# Patient Record
Sex: MALE | Race: WHITE | NOT HISPANIC OR LATINO | Employment: FULL TIME | URBAN - NONMETROPOLITAN AREA
[De-identification: names, ages, dates, MRNs, and addresses within clinical notes are randomized per-mention and may not be internally consistent; named-entity substitution may affect disease eponyms.]

---

## 2017-01-09 ENCOUNTER — OFFICE VISIT (OUTPATIENT)
Dept: SURGERY | Facility: CLINIC | Age: 37
End: 2017-01-09

## 2017-01-09 VITALS
DIASTOLIC BLOOD PRESSURE: 100 MMHG | BODY MASS INDEX: 34.54 KG/M2 | SYSTOLIC BLOOD PRESSURE: 140 MMHG | HEIGHT: 72 IN | WEIGHT: 255 LBS

## 2017-01-09 DIAGNOSIS — K42.9 RECURRENT UMBILICAL HERNIA: Primary | ICD-10-CM

## 2017-01-09 PROCEDURE — 99203 OFFICE O/P NEW LOW 30 MIN: CPT | Performed by: SURGERY

## 2017-01-09 NOTE — PROGRESS NOTES
CHIEF COMPLAINT:    Recurrent umbilical hernia    HISTORY OF PRESENT ILLNESS:    Jovany Knutson is a 36 y.o. male who presents with a recurrent umbilical hernia.  It has been present since age 18.  He has previously had it repaired without mesh in [place].  He says the bulge returned shortly after he returned to work following the repair.  He has minimal discomfort from the hernia which reduces spontaneously.  He continues to work as an  and routinely does heavy lifting.  He notes no obstructive symptoms.    Past Medical History   Diagnosis Date   • Acute bronchitis    • Diabetes mellitus screening    • Dull chest pain      Left chronic chest pain- for 2 years.         • Familial polycythemia      Gets phlebotomy [id number]    • Heavy tobacco smoker      17 year hx of 1-2 ppd.    • Screening for deficiency anemia    • Screening for hyperlipidemia    • Thyroid disorder screening        Past Surgical History   Procedure Laterality Date   • Thumb amputation       Amputation, finger/thumb (Rt. thumb)   • Total knee arthroplasty     • Umbilical hernia repair           Current Outpatient Prescriptions:   •  ibuprofen (ADVIL,MOTRIN) 200 MG tablet, Take 800 mg by mouth Every 6 (Six) Hours As Needed for mild pain (1-3)., Disp: , Rfl:     No Known Allergies    History reviewed. No pertinent family history.    Social History     Social History   • Marital status:      Spouse name: N/A   • Number of children: N/A   • Years of education: N/A     Occupational History   • Not on file.     Social History Main Topics   • Smoking status: Current Every Day Smoker   • Smokeless tobacco: Not on file   • Alcohol use Yes   • Drug use: Not on file   • Sexual activity: Not on file     Other Topics Concern   • Not on file     Social History Narrative       Review of Systems   Constitutional: Negative for activity change, appetite change, chills and fever.   HENT: Negative for hearing loss, nosebleeds and trouble  "swallowing.    Cardiovascular: Negative for chest pain, palpitations and leg swelling.   Gastrointestinal: Negative for abdominal distention, abdominal pain, anal bleeding, blood in stool, constipation, diarrhea, nausea, rectal pain and vomiting.   Endocrine: Negative for cold intolerance, heat intolerance, polydipsia and polyuria.   Genitourinary: Negative for decreased urine volume, difficulty urinating, dysuria, enuresis, frequency, hematuria and urgency.   Musculoskeletal: Negative for arthralgias, back pain, gait problem, myalgias and neck pain.   Skin: Negative for pallor, rash and wound.   Allergic/Immunologic: Negative for immunocompromised state.   Neurological: Negative for dizziness, seizures, weakness, light-headedness, numbness and headaches.   Psychiatric/Behavioral: Negative for agitation and behavioral problems. The patient is not nervous/anxious.        Objective     Visit Vitals   • /100   • Ht 72\" (182.9 cm)   • Wt 255 lb (116 kg)   • BMI 34.58 kg/m2       Physical Exam   Constitutional: He is oriented to person, place, and time. He appears well-developed and well-nourished.   HENT:   Head: Normocephalic and atraumatic.   Nose: Nose normal.   Eyes: Conjunctivae and EOM are normal. Right eye exhibits no discharge. Left eye exhibits no discharge.   Neck: Trachea normal, normal range of motion and phonation normal. Neck supple. No JVD present. No tracheal deviation and no edema present. No thyromegaly present.   Cardiovascular: Normal rate, regular rhythm and normal heart sounds.  Exam reveals no gallop and no friction rub.    No murmur heard.  Pulmonary/Chest: Effort normal and breath sounds normal. No accessory muscle usage. No respiratory distress. He has no decreased breath sounds. He has no wheezes. He has no rales. He exhibits no tenderness.   Abdominal: Soft. He exhibits no distension, no fluid wave, no ascites, no pulsatile midline mass and no mass. There is no tenderness. There is no " rebound and no guarding. A hernia is present.       Musculoskeletal: Normal range of motion. He exhibits no edema, tenderness or deformity.   Lymphadenopathy:     He has no cervical adenopathy.        Left: No supraclavicular adenopathy present.   Neurological: He is alert and oriented to person, place, and time. He has normal strength. No cranial nerve deficit.   Skin: Skin is warm and dry. No rash noted. He is not diaphoretic. No erythema. No pallor.   Psychiatric: He has a normal mood and affect. His speech is normal and behavior is normal. Judgment and thought content normal. Cognition and memory are normal.   Vitals reviewed.      DIAGNOSTIC DATA:    Preop labs ordered    ASSESSMENT:    Recurrent umbilical/incisional hernia    PLAN:    He would like his hernia repaired as he is concerned about it getting larger.  He plans to do this next month to give his workplace time to make scheduling adjustments.  Risks and benefits of open repair of recurrent umbilical (incisional) hernia with mesh were discussed with he and his wife and he wishes to proceed.  Scheduled for 2/6/17.

## 2017-02-01 ENCOUNTER — APPOINTMENT (OUTPATIENT)
Dept: CT IMAGING | Facility: HOSPITAL | Age: 37
End: 2017-02-01

## 2017-02-01 ENCOUNTER — APPOINTMENT (OUTPATIENT)
Dept: GENERAL RADIOLOGY | Facility: HOSPITAL | Age: 37
End: 2017-02-01

## 2017-02-01 ENCOUNTER — APPOINTMENT (OUTPATIENT)
Dept: PREADMISSION TESTING | Facility: HOSPITAL | Age: 37
End: 2017-02-01

## 2017-02-01 ENCOUNTER — HOSPITAL ENCOUNTER (EMERGENCY)
Facility: HOSPITAL | Age: 37
Discharge: HOME | End: 2017-02-01
Attending: EMERGENCY MEDICINE | Admitting: EMERGENCY MEDICINE

## 2017-02-01 VITALS
WEIGHT: 260 LBS | OXYGEN SATURATION: 94 % | HEIGHT: 72 IN | DIASTOLIC BLOOD PRESSURE: 74 MMHG | SYSTOLIC BLOOD PRESSURE: 117 MMHG | HEART RATE: 60 BPM | BODY MASS INDEX: 35.21 KG/M2 | RESPIRATION RATE: 16 BRPM | TEMPERATURE: 98 F

## 2017-02-01 VITALS — BODY MASS INDEX: 34.4 KG/M2 | WEIGHT: 254 LBS | HEIGHT: 72 IN

## 2017-02-01 DIAGNOSIS — R07.89 CHEST PAIN, ATYPICAL: Primary | ICD-10-CM

## 2017-02-01 DIAGNOSIS — R09.1 PLEURISY: ICD-10-CM

## 2017-02-01 LAB
ALBUMIN SERPL-MCNC: 4.4 G/DL (ref 3.4–4.8)
ALBUMIN/GLOB SERPL: 1.7 G/DL (ref 1.1–1.8)
ALP SERPL-CCNC: 67 U/L (ref 38–126)
ALT SERPL W P-5'-P-CCNC: 57 U/L (ref 21–72)
ANION GAP SERPL CALCULATED.3IONS-SCNC: 9 MMOL/L (ref 5–15)
AST SERPL-CCNC: 29 U/L (ref 17–59)
BASOPHILS # BLD AUTO: 0.06 10*3/MM3 (ref 0–0.2)
BASOPHILS NFR BLD AUTO: 0.6 % (ref 0–2)
BILIRUB SERPL-MCNC: 0.7 MG/DL (ref 0.2–1.3)
BILIRUB UR QL STRIP: NEGATIVE
BUN BLD-MCNC: 9 MG/DL (ref 7–21)
BUN/CREAT SERPL: 12 (ref 7–25)
CALCIUM SPEC-SCNC: 9.2 MG/DL (ref 8.4–10.2)
CHLORIDE SERPL-SCNC: 104 MMOL/L (ref 95–110)
CLARITY UR: CLEAR
CO2 SERPL-SCNC: 26 MMOL/L (ref 22–31)
COLOR UR: YELLOW
CREAT BLD-MCNC: 0.75 MG/DL (ref 0.7–1.3)
DEPRECATED RDW RBC AUTO: 41 FL (ref 35.1–43.9)
EOSINOPHIL # BLD AUTO: 0.18 10*3/MM3 (ref 0–0.7)
EOSINOPHIL NFR BLD AUTO: 1.7 % (ref 0–7)
ERYTHROCYTE [DISTWIDTH] IN BLOOD BY AUTOMATED COUNT: 12.9 % (ref 11.5–14.5)
GFR SERPL CREATININE-BSD FRML MDRD: 117 ML/MIN/1.73 (ref 70–162)
GLOBULIN UR ELPH-MCNC: 2.6 GM/DL (ref 2.3–3.5)
GLUCOSE BLD-MCNC: 99 MG/DL (ref 60–100)
GLUCOSE UR STRIP-MCNC: NEGATIVE MG/DL
HCT VFR BLD AUTO: 50.9 % (ref 39–49)
HGB BLD-MCNC: 18.6 G/DL (ref 13.7–17.3)
HGB UR QL STRIP.AUTO: NEGATIVE
HOLD SPECIMEN: NORMAL
HOLD SPECIMEN: NORMAL
IMM GRANULOCYTES # BLD: 0.02 10*3/MM3 (ref 0–0.02)
IMM GRANULOCYTES NFR BLD: 0.2 % (ref 0–0.5)
INR PPP: 0.95 (ref 0.8–1.2)
KETONES UR QL STRIP: NEGATIVE
LEUKOCYTE ESTERASE UR QL STRIP.AUTO: NEGATIVE
LYMPHOCYTES # BLD AUTO: 3.62 10*3/MM3 (ref 0.6–4.2)
LYMPHOCYTES NFR BLD AUTO: 33.9 % (ref 10–50)
MCH RBC QN AUTO: 31.9 PG (ref 26.5–34)
MCHC RBC AUTO-ENTMCNC: 36.5 G/DL (ref 31.5–36.3)
MCV RBC AUTO: 87.3 FL (ref 80–98)
MONOCYTES # BLD AUTO: 0.74 10*3/MM3 (ref 0–0.9)
MONOCYTES NFR BLD AUTO: 6.9 % (ref 0–12)
MRSA DNA SPEC QL NAA+PROBE: NEGATIVE
NEUTROPHILS # BLD AUTO: 6.06 10*3/MM3 (ref 2–8.6)
NEUTROPHILS NFR BLD AUTO: 56.7 % (ref 37–80)
NITRITE UR QL STRIP: NEGATIVE
NRBC BLD MANUAL-RTO: 0 /100 WBC (ref 0–0)
PH UR STRIP.AUTO: 7 [PH] (ref 5–9)
PLATELET # BLD AUTO: 183 10*3/MM3 (ref 150–450)
PMV BLD AUTO: 12.6 FL (ref 8–12)
POTASSIUM BLD-SCNC: 4.1 MMOL/L (ref 3.5–5.1)
PROT SERPL-MCNC: 7 G/DL (ref 6.3–8.6)
PROT UR QL STRIP: NEGATIVE
PROTHROMBIN TIME: 12.7 SECONDS (ref 11.1–15.3)
RBC # BLD AUTO: 5.83 10*6/MM3 (ref 4.37–5.74)
SODIUM BLD-SCNC: 139 MMOL/L (ref 137–145)
SP GR UR STRIP: 1.01 (ref 1–1.03)
TROPONIN I SERPL-MCNC: <0.012 NG/ML
UROBILINOGEN UR QL STRIP: NORMAL
WBC NRBC COR # BLD: 10.68 10*3/MM3 (ref 3.2–9.8)
WHOLE BLOOD HOLD SPECIMEN: NORMAL
WHOLE BLOOD HOLD SPECIMEN: NORMAL

## 2017-02-01 PROCEDURE — 93005 ELECTROCARDIOGRAM TRACING: CPT | Performed by: EMERGENCY MEDICINE

## 2017-02-01 PROCEDURE — 87641 MR-STAPH DNA AMP PROBE: CPT | Performed by: SURGERY

## 2017-02-01 PROCEDURE — 85610 PROTHROMBIN TIME: CPT | Performed by: EMERGENCY MEDICINE

## 2017-02-01 PROCEDURE — 80053 COMPREHEN METABOLIC PANEL: CPT | Performed by: EMERGENCY MEDICINE

## 2017-02-01 PROCEDURE — 70450 CT HEAD/BRAIN W/O DYE: CPT

## 2017-02-01 PROCEDURE — 93010 ELECTROCARDIOGRAM REPORT: CPT | Performed by: INTERNAL MEDICINE

## 2017-02-01 PROCEDURE — 81003 URINALYSIS AUTO W/O SCOPE: CPT | Performed by: EMERGENCY MEDICINE

## 2017-02-01 PROCEDURE — 84484 ASSAY OF TROPONIN QUANT: CPT | Performed by: EMERGENCY MEDICINE

## 2017-02-01 PROCEDURE — 99284 EMERGENCY DEPT VISIT MOD MDM: CPT

## 2017-02-01 PROCEDURE — 85025 COMPLETE CBC W/AUTO DIFF WBC: CPT | Performed by: EMERGENCY MEDICINE

## 2017-02-01 PROCEDURE — 71010 HC CHEST PA OR AP: CPT

## 2017-02-01 PROCEDURE — 93005 ELECTROCARDIOGRAM TRACING: CPT

## 2017-02-01 RX ORDER — ASPIRIN 81 MG/1
81 TABLET ORAL DAILY
Qty: 30 TABLET | Refills: 0 | Status: SHIPPED | OUTPATIENT
Start: 2017-02-01

## 2017-02-01 RX ORDER — SODIUM CHLORIDE 0.9 % (FLUSH) 0.9 %
10 SYRINGE (ML) INJECTION AS NEEDED
Status: DISCONTINUED | OUTPATIENT
Start: 2017-02-01 | End: 2017-02-01 | Stop reason: HOSPADM

## 2017-02-01 RX ORDER — ASPIRIN 325 MG
325 TABLET ORAL ONCE
Status: DISCONTINUED | OUTPATIENT
Start: 2017-02-01 | End: 2017-02-01

## 2017-02-01 RX ADMIN — NITROGLYCERIN 1 INCH: 20 OINTMENT TOPICAL at 10:55

## 2017-02-01 RX ADMIN — Medication 10 ML: at 10:59

## 2017-02-01 NOTE — ED PROVIDER NOTES
Subjective   HPI Comments: 35 yo male, with no current medical problems being treated, notes onset of chest pain last night.  States this was a tightness in the chest lasting hours to a dull ache last night.  Patient also notes an acute onset headache during his chest pain causing his blood pressure to elevated.  Denies f/c.  Denies n/v, though did have nausea last night.  No known problems.      Patient is a 36 y.o. male presenting with chest pain.   History provided by:  Patient   used: No    Chest Pain   Pain location:  Substernal area  Pain quality: aching, dull and pressure    Pain quality: not sharp and no tightness    Pain radiates to:  Does not radiate  Pain severity:  Moderate  Onset quality:  Gradual  Duration: 12   Timing:  Unable to specify  Progression:  Improving  Chronicity:  Recurrent (Patient with prior bouts of CP, which led to a stress test two years ago without notable pathology)  Context: breathing and stress    Relieved by:  Nothing  Worsened by:  Nothing  Ineffective treatments:  None tried  Associated symptoms: anxiety, cough, dizziness, headache, nausea, near-syncope, shortness of breath and weakness    Associated symptoms: no abdominal pain, no altered mental status, no back pain, no diaphoresis, no fever, no lower extremity edema, no numbness, no orthopnea, no palpitations, no PND, no syncope and no vomiting        Review of Systems   Constitutional: Negative for appetite change, chills, diaphoresis and fever.   HENT: Negative for congestion.    Eyes: Negative.  Negative for photophobia and visual disturbance.   Respiratory: Positive for cough and shortness of breath. Negative for chest tightness and wheezing.    Cardiovascular: Positive for chest pain and near-syncope. Negative for palpitations, orthopnea, syncope and PND.   Gastrointestinal: Positive for nausea. Negative for abdominal pain, constipation, diarrhea and vomiting.   Endocrine: Negative.    Genitourinary:  Negative.  Negative for decreased urine volume, dysuria, flank pain and hematuria.   Musculoskeletal: Positive for neck pain (chronic at T1 spinous process). Negative for arthralgias, back pain, myalgias and neck stiffness.   Skin: Negative.  Negative for pallor.   Neurological: Positive for dizziness, weakness, light-headedness and headaches. Negative for syncope and numbness.   Psychiatric/Behavioral: Negative.  Negative for confusion and suicidal ideas. The patient is not nervous/anxious.        Past Medical History   Diagnosis Date   • Acute bronchitis    • Diabetes mellitus screening    • Dull chest pain      Left chronic chest pain- for 2 years.         • Familial polycythemia      Pt. states he is supposed to ets phlebotomy [id number] per family  but he has only gave blood 1 time in the past year.   • Heavy tobacco smoker      17 year hx of 1-2 ppd.    • Screening for deficiency anemia    • Screening for hyperlipidemia    • Thyroid disorder screening        No Known Allergies    Past Surgical History   Procedure Laterality Date   • Thumb amputation       Amputation, finger/thumb (Rt. thumb)   • Total knee arthroplasty     • Umbilical hernia repair         No family history on file.    Social History     Social History   • Marital status:      Spouse name: N/A   • Number of children: N/A   • Years of education: N/A     Social History Main Topics   • Smoking status: Current Every Day Smoker   • Smokeless tobacco: None   • Alcohol use Yes   • Drug use: None   • Sexual activity: Not Asked     Other Topics Concern   • None     Social History Narrative           Objective   Physical Exam   Constitutional: He is oriented to person, place, and time. He appears well-developed and well-nourished. No distress.   HENT:   Head: Normocephalic and atraumatic.   Eyes: Conjunctivae and EOM are normal.   Neck: Normal range of motion. Neck supple. No JVD present.   Tenderness, mild over T1 vertebrae   Cardiovascular:  Normal rate, regular rhythm, normal heart sounds and intact distal pulses.  Exam reveals no gallop and no friction rub.    No murmur heard.  Pulmonary/Chest: Effort normal. No respiratory distress. He has no wheezes. He has no rales. He exhibits no tenderness.   Abdominal: Soft. Bowel sounds are normal. He exhibits no distension and no mass. There is no tenderness. There is no rebound and no guarding.   Musculoskeletal: Normal range of motion.   Neurological: He is alert and oriented to person, place, and time. No cranial nerve deficit. He exhibits normal muscle tone.   Skin: Skin is warm and dry.   Psychiatric: He has a normal mood and affect. His behavior is normal. Judgment and thought content normal.   Nursing note and vitals reviewed.      Procedures         ED Course  ED Course      Labs Reviewed   CBC WITH AUTO DIFFERENTIAL - Abnormal; Notable for the following:        Result Value    WBC 10.68 (*)     RBC 5.83 (*)     Hemoglobin 18.6 (*)     Hematocrit 50.9 (*)     MCHC 36.5 (*)     MPV 12.6 (*)     All other components within normal limits   COMPREHENSIVE METABOLIC PANEL - Normal   TROPONIN (IN-HOUSE) - Normal   PROTIME-INR - Normal    Narrative:     Therapeutic range for most indications is 2.0-3.0 INR,  or 2.5-3.5 for mechanical heart valves.   URINALYSIS W/ CULTURE IF INDICATED - Normal    Narrative:     Urine microscopic not indicated.   RAINBOW DRAW    Narrative:     The following orders were created for panel order [facility] Draw.  Procedure                               Abnormality         Status                     ---------                               -----------         ------                     Light Blue Top[[id number]]                                    In process                 Green Top (Gel)[[id number]]                                   In process                 Lavender Top[[id number]]                                      In process                 Gold Top - SST[[id number]]                                     In process                   Please view results for these tests on the individual orders.   CBC AND DIFFERENTIAL    Narrative:     The following orders were created for panel order CBC & Differential.  Procedure                               Abnormality         Status                     ---------                               -----------         ------                     CBC Auto Differential[[id number]]         Abnormal            Final result                 Please view results for these tests on the individual orders.   LIGHT BLUE TOP   GREEN TOP   LAVENDER TOP   GOLD TOP - SST       CT Head Without Contrast   Final Result   No evidence of intracranial hemorrhage, mass effect or large acute infarct.      Electronically signed by:  Pedrito Pierce MD  2/1/2017 11:49 AM CST         XR Chest 1 View   Final Result   No radiographic evidence of acute cardiopulmonary disease.      Electronically signed by:  Diamante Wells MD  2/1/2017 11:33 AM CST           No significant findings.  Nonanginal quality symtoms >12 hours with negative markers and normal EKG.  Outpatient followup with cardiology for stress testing.  Continues stable in the ED.              MDM    Final diagnoses:   Chest pain, atypical   Pleurisy            Saman Cruz MD  02/01/17 [id number]

## 2017-02-01 NOTE — ED NOTES
Pt alert x4 respirations easy non labored, ambulates without distress.      Akilah Pardo RN  02/01/17 3920

## 2017-02-01 NOTE — DISCHARGE INSTRUCTIONS
Attempt to quit smoking as this can worsen symptoms.  Followup with Dr. [facility] for stress testing, please call for appointment time which fits with your schedule.  Return with any new or worsening symptoms, or any concerns for further evaluation.

## 2017-02-03 ENCOUNTER — ANESTHESIA EVENT (OUTPATIENT)
Dept: PERIOP | Facility: HOSPITAL | Age: 37
End: 2017-02-03

## 2017-02-06 ENCOUNTER — HOSPITAL ENCOUNTER (OUTPATIENT)
Facility: HOSPITAL | Age: 37
Setting detail: HOSPITAL OUTPATIENT SURGERY
Discharge: HOME | End: 2017-02-06
Attending: SURGERY | Admitting: SURGERY

## 2017-02-06 ENCOUNTER — ANESTHESIA (OUTPATIENT)
Dept: PERIOP | Facility: HOSPITAL | Age: 37
End: 2017-02-06

## 2017-02-06 VITALS
OXYGEN SATURATION: 96 % | WEIGHT: 253.53 LBS | SYSTOLIC BLOOD PRESSURE: 153 MMHG | TEMPERATURE: 97.9 F | DIASTOLIC BLOOD PRESSURE: 82 MMHG | BODY MASS INDEX: 34.38 KG/M2 | HEART RATE: 84 BPM | RESPIRATION RATE: 18 BRPM

## 2017-02-06 LAB
AMPHET+METHAMPHET UR QL: NEGATIVE
BARBITURATES UR QL SCN: NEGATIVE
BENZODIAZ UR QL SCN: NEGATIVE
CANNABINOIDS SERPL QL: POSITIVE
COCAINE UR QL: NEGATIVE
GLUCOSE BLDC GLUCOMTR-MCNC: 100 MG/DL (ref 70–130)
METHADONE UR QL SCN: NEGATIVE
OPIATES UR QL: NEGATIVE
OXYCODONE UR QL SCN: NEGATIVE

## 2017-02-06 PROCEDURE — 82962 GLUCOSE BLOOD TEST: CPT

## 2017-02-06 PROCEDURE — 80307 DRUG TEST PRSMV CHEM ANLYZR: CPT | Performed by: ANESTHESIOLOGY

## 2017-02-06 PROCEDURE — 25010000002 FENTANYL CITRATE (PF) 100 MCG/2ML SOLUTION: Performed by: NURSE ANESTHETIST, CERTIFIED REGISTERED

## 2017-02-06 PROCEDURE — 25010000002 PROPOFOL 10 MG/ML EMULSION: Performed by: NURSE ANESTHETIST, CERTIFIED REGISTERED

## 2017-02-06 PROCEDURE — 49560 PR REPAIR INCISIONAL HERNIA,REDUCIBLE: CPT | Performed by: SURGERY

## 2017-02-06 PROCEDURE — 25010000002 MIDAZOLAM PER 1 MG: Performed by: ANESTHESIOLOGY

## 2017-02-06 PROCEDURE — 25010000003 CEFAZOLIN PER 500 MG: Performed by: SURGERY

## 2017-02-06 PROCEDURE — C1781 MESH (IMPLANTABLE): HCPCS | Performed by: SURGERY

## 2017-02-06 PROCEDURE — 49568 PR IMPLANT MESH HERNIA REPAIR/DEBRIDEMENT CLOSURE: CPT | Performed by: SURGERY

## 2017-02-06 PROCEDURE — 25010000002 NEOSTIGMINE PER 0.5 MG: Performed by: NURSE ANESTHETIST, CERTIFIED REGISTERED

## 2017-02-06 PROCEDURE — 25010000002 SUCCINYLCHOLINE PER 20 MG: Performed by: NURSE ANESTHETIST, CERTIFIED REGISTERED

## 2017-02-06 DEVICE — VENTRALEX ST HERNIA PATCH
Type: IMPLANTABLE DEVICE | Site: ABDOMEN | Status: FUNCTIONAL
Brand: VENTRALEX ST HERNIA PATCH

## 2017-02-06 RX ORDER — SCOLOPAMINE TRANSDERMAL SYSTEM 1 MG/1
1 PATCH, EXTENDED RELEASE TRANSDERMAL ONCE
Status: DISCONTINUED | OUTPATIENT
Start: 2017-02-06 | End: 2017-02-06 | Stop reason: HOSPADM

## 2017-02-06 RX ORDER — FENTANYL CITRATE 50 UG/ML
INJECTION, SOLUTION INTRAMUSCULAR; INTRAVENOUS AS NEEDED
Status: DISCONTINUED | OUTPATIENT
Start: 2017-02-06 | End: 2017-02-06 | Stop reason: SURG

## 2017-02-06 RX ORDER — ONDANSETRON 2 MG/ML
4 INJECTION INTRAMUSCULAR; INTRAVENOUS ONCE AS NEEDED
Status: DISCONTINUED | OUTPATIENT
Start: 2017-02-06 | End: 2017-02-06 | Stop reason: HOSPADM

## 2017-02-06 RX ORDER — PROPOFOL 10 MG/ML
VIAL (ML) INTRAVENOUS AS NEEDED
Status: DISCONTINUED | OUTPATIENT
Start: 2017-02-06 | End: 2017-02-06 | Stop reason: SURG

## 2017-02-06 RX ORDER — LABETALOL HYDROCHLORIDE 5 MG/ML
5 INJECTION, SOLUTION INTRAVENOUS
Status: DISCONTINUED | OUTPATIENT
Start: 2017-02-06 | End: 2017-02-06 | Stop reason: HOSPADM

## 2017-02-06 RX ORDER — MORPHINE SULFATE 2 MG/ML
2 INJECTION, SOLUTION INTRAMUSCULAR; INTRAVENOUS
Status: DISCONTINUED | OUTPATIENT
Start: 2017-02-06 | End: 2017-02-06 | Stop reason: HOSPADM

## 2017-02-06 RX ORDER — ALBUTEROL SULFATE 2.5 MG/3ML
2.5 SOLUTION RESPIRATORY (INHALATION) ONCE
Status: COMPLETED | OUTPATIENT
Start: 2017-02-06 | End: 2017-02-06

## 2017-02-06 RX ORDER — FLUMAZENIL 0.1 MG/ML
0.2 INJECTION INTRAVENOUS AS NEEDED
Status: DISCONTINUED | OUTPATIENT
Start: 2017-02-06 | End: 2017-02-06 | Stop reason: HOSPADM

## 2017-02-06 RX ORDER — GLYCOPYRROLATE 0.2 MG/ML
INJECTION INTRAMUSCULAR; INTRAVENOUS AS NEEDED
Status: DISCONTINUED | OUTPATIENT
Start: 2017-02-06 | End: 2017-02-06 | Stop reason: SURG

## 2017-02-06 RX ORDER — SODIUM CHLORIDE 0.9 % (FLUSH) 0.9 %
1-10 SYRINGE (ML) INJECTION AS NEEDED
Status: DISCONTINUED | OUTPATIENT
Start: 2017-02-06 | End: 2017-02-06 | Stop reason: HOSPADM

## 2017-02-06 RX ORDER — SUCCINYLCHOLINE CHLORIDE 20 MG/ML
INJECTION INTRAMUSCULAR; INTRAVENOUS AS NEEDED
Status: DISCONTINUED | OUTPATIENT
Start: 2017-02-06 | End: 2017-02-06 | Stop reason: SURG

## 2017-02-06 RX ORDER — HYDRALAZINE HYDROCHLORIDE 20 MG/ML
5 INJECTION INTRAMUSCULAR; INTRAVENOUS
Status: DISCONTINUED | OUTPATIENT
Start: 2017-02-06 | End: 2017-02-06 | Stop reason: HOSPADM

## 2017-02-06 RX ORDER — DIPHENHYDRAMINE HYDROCHLORIDE 50 MG/ML
12.5 INJECTION INTRAMUSCULAR; INTRAVENOUS
Status: DISCONTINUED | OUTPATIENT
Start: 2017-02-06 | End: 2017-02-06 | Stop reason: HOSPADM

## 2017-02-06 RX ORDER — FAMOTIDINE 10 MG/ML
20 INJECTION, SOLUTION INTRAVENOUS ONCE
Status: DISCONTINUED | OUTPATIENT
Start: 2017-02-06 | End: 2017-02-06 | Stop reason: HOSPADM

## 2017-02-06 RX ORDER — SODIUM CHLORIDE 9 MG/ML
1000 INJECTION, SOLUTION INTRAVENOUS CONTINUOUS
Status: DISCONTINUED | OUTPATIENT
Start: 2017-02-06 | End: 2017-02-06 | Stop reason: HOSPADM

## 2017-02-06 RX ORDER — BUPIVACAINE HYDROCHLORIDE AND EPINEPHRINE 5; 5 MG/ML; UG/ML
INJECTION, SOLUTION EPIDURAL; INTRACAUDAL; PERINEURAL AS NEEDED
Status: DISCONTINUED | OUTPATIENT
Start: 2017-02-06 | End: 2017-02-06 | Stop reason: HOSPADM

## 2017-02-06 RX ORDER — LIDOCAINE HYDROCHLORIDE 10 MG/ML
0.5 INJECTION, SOLUTION EPIDURAL; INFILTRATION; INTRACAUDAL; PERINEURAL ONCE AS NEEDED
Status: DISCONTINUED | OUTPATIENT
Start: 2017-02-06 | End: 2017-02-06 | Stop reason: HOSPADM

## 2017-02-06 RX ORDER — DEXAMETHASONE SODIUM PHOSPHATE 4 MG/ML
8 INJECTION, SOLUTION INTRA-ARTICULAR; INTRALESIONAL; INTRAMUSCULAR; INTRAVENOUS; SOFT TISSUE ONCE AS NEEDED
Status: DISCONTINUED | OUTPATIENT
Start: 2017-02-06 | End: 2017-02-06 | Stop reason: HOSPADM

## 2017-02-06 RX ORDER — HYDROCODONE BITARTRATE AND ACETAMINOPHEN 5; 325 MG/1; MG/1
1-2 TABLET ORAL EVERY 4 HOURS PRN
Qty: 30 TABLET | Refills: 0 | Status: SHIPPED | OUTPATIENT
Start: 2017-02-06

## 2017-02-06 RX ORDER — DEXAMETHASONE SODIUM PHOSPHATE 4 MG/ML
2 INJECTION, SOLUTION INTRA-ARTICULAR; INTRALESIONAL; INTRAMUSCULAR; INTRAVENOUS; SOFT TISSUE ONCE AS NEEDED
Status: DISCONTINUED | OUTPATIENT
Start: 2017-02-06 | End: 2017-02-06 | Stop reason: HOSPADM

## 2017-02-06 RX ORDER — ACETAMINOPHEN 325 MG/1
650 TABLET ORAL ONCE AS NEEDED
Status: DISCONTINUED | OUTPATIENT
Start: 2017-02-06 | End: 2017-02-06 | Stop reason: HOSPADM

## 2017-02-06 RX ORDER — LIDOCAINE HYDROCHLORIDE 20 MG/ML
INJECTION, SOLUTION INFILTRATION; PERINEURAL AS NEEDED
Status: DISCONTINUED | OUTPATIENT
Start: 2017-02-06 | End: 2017-02-06 | Stop reason: SURG

## 2017-02-06 RX ORDER — MIDAZOLAM HYDROCHLORIDE 1 MG/ML
1 INJECTION INTRAMUSCULAR; INTRAVENOUS
Status: DISCONTINUED | OUTPATIENT
Start: 2017-02-06 | End: 2017-02-06 | Stop reason: HOSPADM

## 2017-02-06 RX ORDER — BUPIVACAINE HYDROCHLORIDE AND EPINEPHRINE 5; 5 MG/ML; UG/ML
INJECTION, SOLUTION EPIDURAL; INTRACAUDAL; PERINEURAL
Status: DISCONTINUED
Start: 2017-02-06 | End: 2017-02-06 | Stop reason: HOSPADM

## 2017-02-06 RX ORDER — METOCLOPRAMIDE HYDROCHLORIDE 5 MG/ML
10 INJECTION INTRAMUSCULAR; INTRAVENOUS ONCE AS NEEDED
Status: DISCONTINUED | OUTPATIENT
Start: 2017-02-06 | End: 2017-02-06 | Stop reason: HOSPADM

## 2017-02-06 RX ORDER — MIDAZOLAM HYDROCHLORIDE 1 MG/ML
2 INJECTION INTRAMUSCULAR; INTRAVENOUS
Status: DISCONTINUED | OUTPATIENT
Start: 2017-02-06 | End: 2017-02-06 | Stop reason: HOSPADM

## 2017-02-06 RX ORDER — NALOXONE HCL 0.4 MG/ML
0.2 VIAL (ML) INJECTION AS NEEDED
Status: DISCONTINUED | OUTPATIENT
Start: 2017-02-06 | End: 2017-02-06 | Stop reason: HOSPADM

## 2017-02-06 RX ORDER — PROMETHAZINE HYDROCHLORIDE 25 MG/ML
2.5 INJECTION, SOLUTION INTRAMUSCULAR; INTRAVENOUS
Status: DISCONTINUED | OUTPATIENT
Start: 2017-02-06 | End: 2017-02-06 | Stop reason: HOSPADM

## 2017-02-06 RX ORDER — ROCURONIUM BROMIDE 10 MG/ML
INJECTION, SOLUTION INTRAVENOUS AS NEEDED
Status: DISCONTINUED | OUTPATIENT
Start: 2017-02-06 | End: 2017-02-06 | Stop reason: SURG

## 2017-02-06 RX ORDER — ACETAMINOPHEN 650 MG/1
650 SUPPOSITORY RECTAL ONCE AS NEEDED
Status: DISCONTINUED | OUTPATIENT
Start: 2017-02-06 | End: 2017-02-06 | Stop reason: HOSPADM

## 2017-02-06 RX ADMIN — FENTANYL CITRATE 100 MCG: 50 INJECTION, SOLUTION INTRAMUSCULAR; INTRAVENOUS at 14:10

## 2017-02-06 RX ADMIN — LIDOCAINE HYDROCHLORIDE 60 MG: 20 INJECTION, SOLUTION INFILTRATION; PERINEURAL at 13:44

## 2017-02-06 RX ADMIN — PROPOFOL 40 MG: 10 INJECTION, EMULSION INTRAVENOUS at 14:10

## 2017-02-06 RX ADMIN — PROPOFOL 260 MG: 10 INJECTION, EMULSION INTRAVENOUS at 13:45

## 2017-02-06 RX ADMIN — FENTANYL CITRATE 100 MCG: 50 INJECTION, SOLUTION INTRAMUSCULAR; INTRAVENOUS at 13:44

## 2017-02-06 RX ADMIN — ROCURONIUM BROMIDE 5 MG: 10 INJECTION INTRAVENOUS at 13:45

## 2017-02-06 RX ADMIN — FENTANYL CITRATE 50 MCG: 50 INJECTION, SOLUTION INTRAMUSCULAR; INTRAVENOUS at 14:04

## 2017-02-06 RX ADMIN — FENTANYL CITRATE 100 MCG: 50 INJECTION, SOLUTION INTRAMUSCULAR; INTRAVENOUS at 14:25

## 2017-02-06 RX ADMIN — SUCCINYLCHOLINE CHLORIDE 200 MG: 20 INJECTION, SOLUTION INTRAMUSCULAR; INTRAVENOUS at 13:45

## 2017-02-06 RX ADMIN — GLYCOPYRROLATE 0.6 MG: 0.2 INJECTION, SOLUTION INTRAMUSCULAR; INTRAVENOUS at 14:25

## 2017-02-06 RX ADMIN — MIDAZOLAM 2 MG: 1 INJECTION INTRAMUSCULAR; INTRAVENOUS at 13:36

## 2017-02-06 RX ADMIN — SCOPALAMINE 1 PATCH: 1 PATCH, EXTENDED RELEASE TRANSDERMAL at 12:53

## 2017-02-06 RX ADMIN — NEOSTIGMINE METHYLSULFATE 4 MG: 1 INJECTION, SOLUTION INTRAMUSCULAR; INTRAVENOUS; SUBCUTANEOUS at 14:25

## 2017-02-06 RX ADMIN — ALBUTEROL SULFATE 2.5 MG: 2.5 SOLUTION RESPIRATORY (INHALATION) at 12:53

## 2017-02-06 RX ADMIN — MIDAZOLAM 2 MG: 1 INJECTION INTRAMUSCULAR; INTRAVENOUS at 13:40

## 2017-02-06 RX ADMIN — ROCURONIUM BROMIDE 25 MG: 10 INJECTION INTRAVENOUS at 13:55

## 2017-02-06 RX ADMIN — CEFAZOLIN SODIUM 2 G: 1 INJECTION, POWDER, FOR SOLUTION INTRAMUSCULAR; INTRAVENOUS at 13:49

## 2017-02-06 RX ADMIN — SODIUM CHLORIDE 1000 ML: 9 INJECTION, SOLUTION INTRAVENOUS at 12:53

## 2017-02-06 NOTE — INTERVAL H&P NOTE
H&P reviewed. The patient was examined and there are no changes to the H&P.           This document has been electronically signed by Emory Angeles MD on February 6, 2017 1:26 PM

## 2017-02-06 NOTE — ANESTHESIA PREPROCEDURE EVALUATION
Anesthesia Evaluation     Patient summary reviewed and Nursing notes reviewed    Airway   Mallampati: II  TM distance: >3 FB  Neck ROM: full  possible difficult intubation  Dental    (+) poor dentation        Pulmonary    (+) hx of smoking (Smokes 1-1/2 packs/day), decreased breath sounds (Expiratory wheeze;right chest greater than left.),     PE comment: Albuterol nebulizer given pre-op.  Cardiovascular - negative cardio ROS and normal exam    ECG reviewed  Rhythm: regular  Rate: normal  ROS comment: EKG:NSR    Neuro/Psych- negative ROS  GI/Hepatic/Renal/Endo - negative ROS     Musculoskeletal (-) negative ROS    Abdominal    Substance History - negative use     OB/GYN negative ob/gyn ROS         Other - negative ROS                            Anesthesia Plan    ASA 3     general     intravenous induction   Anesthetic plan and risks discussed with patient.

## 2017-02-06 NOTE — ANESTHESIA PROCEDURE NOTES
Airway  Urgency: elective      General Information and Staff    Patient location during procedure: OR  Anesthesiologist: VEE ROMO  CRNA: BONIFACIO SELLERS    Indications and Patient Condition  Indications for airway management: airway protection    Preoxygenated: yes  MILS maintained throughout  Mask difficulty assessment: 1 - vent by mask    Final Airway Details  Final airway type: endotracheal airway      Successful airway: ETT  Cuffed: yes   Successful intubation technique: direct laryngoscopy  Facilitating devices/methods: anterior pressure/BURP  Endotracheal tube insertion site: oral  Blade: Wai  Blade size: #4  ETT size: 8.0 mm  Cormack-Lehane Classification: grade IIb - view of arytenoids or posterior of glottis only  Placement verified by: chest auscultation and capnometry   Measured from: gums  ETT to gums (cm): 21  Number of attempts at approach: 1    Additional Comments  With anterior pressure grade IIb.

## 2017-02-06 NOTE — PLAN OF CARE
Problem: Patient Care Overview (Adult)  Goal: Plan of Care Review  Outcome: Outcome(s) achieved Date Met:  02/06/17  Discharge criteria met.  Goal: Adult Individualization and Mutuality  Outcome: Outcome(s) achieved Date Met:  02/06/17  Goal: Discharge Needs Assessment  Outcome: Outcome(s) achieved Date Met:  02/06/17    Problem: Perioperative Period (Adult)  Goal: Signs and Symptoms of Listed Potential Problems Will be Absent or Manageable (Perioperative Period)  Outcome: Outcome(s) achieved Date Met:  02/06/17

## 2017-02-06 NOTE — OP NOTE
UMBILICAL HERNIA REPAIR  Procedure Note    Jovany Knutson  2/6/2017    Pre-op Diagnosis:   HERNIA , INCISIONAL     Post-op Diagnosis:     Post-Op Diagnosis Codes:     * Hernia, incisional [K43.2]    Procedure/CPT® Codes:      Procedure(s):  OPEN REPAIR RECURRENT UMBILICAL HERNIA WITH MESH (open incisional hernia repair with mesh)    Surgeon(s):  Emory Angeles MD    Anesthesia: General    Staff:   Circulator: Lyn Gee RN; Kg Solorzano RN  Scrub Person: Faith Eubanks  Assistant: Debbie Dia    Estimated Blood Loss: 10 mL    Specimens:                * No specimens in log *      Drains:           Findings: Two small periumbilical hernias, fat containing    Complications: none    Indication: See H and P    Operative Note:    Patient was seen and consented in same day surgery.  He was then transferred to the OR and general anesthetic was administered.  He was orotracheally intubated without incident.  A preop briefing was performed and the right groin prepped and draped in normal sterile fashion.  Following this a timeout was performed.    Following timeout the scar above his umbilicus from his prior repair was identified.  Local anesthetic was injected in this area.  The scar was excised using knife and bovie.  The bovie was then used to dissect down to the midline fascia around a small hernia sac which contained fat just above his umbilical stalk.  Once this sac had been clearly delineated we sharply dissected around his umbilical stalk using scissors.  A small hernia was identified in this location as well.  The umbilical stalk was then divided away from this hernia sac as well. This revealed that he did have two small hernias which both contained fat and were directly adjacent to one another.  The small fascial bridge between the two hernias was then divided leaving a combined defect of about 2cm.  The hernia contents were reduced below the level of the fascia.  The posterior aspect of the fascia  was then cleared for several cm using cautery.  A 4.3cm round VentraLight patch was brought onto the field and placed below the level of the fascia.  It was secured transfascially using four 0 ethibond sutures.  It appeared to be in good position.  The wound was irrigated.  The fascial defect was closed using two figure of eight 0 ethibond sutures.  The umbilical stalk was reattached using 3-0 vicryl.  The wound was then closed in layers using 3-0 and 4-0 vicryls.  Mastisol, steristrips and a pressure dressing were placed.          This document has been electronically signed by Emory Angeles MD on February 6, 2017 4:58 PM      Emory Angeles MD     Date: 2/6/2017  Time: 4:51 PM

## 2017-02-06 NOTE — H&P (VIEW-ONLY)
CHIEF COMPLAINT:    Recurrent umbilical hernia    HISTORY OF PRESENT ILLNESS:    Jovany Knutson is a 36 y.o. male who presents with a recurrent umbilical hernia.  It has been present since age 18.  He has previously had it repaired without mesh in [place].  He says the bulge returned shortly after he returned to work following the repair.  He has minimal discomfort from the hernia which reduces spontaneously.  He continues to work as an  and routinely does heavy lifting.  He notes no obstructive symptoms.    Past Medical History   Diagnosis Date   • Acute bronchitis    • Diabetes mellitus screening    • Dull chest pain      Left chronic chest pain- for 2 years.         • Familial polycythemia      Gets phlebotomy [id number]    • Heavy tobacco smoker      17 year hx of 1-2 ppd.    • Screening for deficiency anemia    • Screening for hyperlipidemia    • Thyroid disorder screening        Past Surgical History   Procedure Laterality Date   • Thumb amputation       Amputation, finger/thumb (Rt. thumb)   • Total knee arthroplasty     • Umbilical hernia repair           Current Outpatient Prescriptions:   •  ibuprofen (ADVIL,MOTRIN) 200 MG tablet, Take 800 mg by mouth Every 6 (Six) Hours As Needed for mild pain (1-3)., Disp: , Rfl:     No Known Allergies    History reviewed. No pertinent family history.    Social History     Social History   • Marital status:      Spouse name: N/A   • Number of children: N/A   • Years of education: N/A     Occupational History   • Not on file.     Social History Main Topics   • Smoking status: Current Every Day Smoker   • Smokeless tobacco: Not on file   • Alcohol use Yes   • Drug use: Not on file   • Sexual activity: Not on file     Other Topics Concern   • Not on file     Social History Narrative       Review of Systems   Constitutional: Negative for activity change, appetite change, chills and fever.   HENT: Negative for hearing loss, nosebleeds and trouble  "swallowing.    Cardiovascular: Negative for chest pain, palpitations and leg swelling.   Gastrointestinal: Negative for abdominal distention, abdominal pain, anal bleeding, blood in stool, constipation, diarrhea, nausea, rectal pain and vomiting.   Endocrine: Negative for cold intolerance, heat intolerance, polydipsia and polyuria.   Genitourinary: Negative for decreased urine volume, difficulty urinating, dysuria, enuresis, frequency, hematuria and urgency.   Musculoskeletal: Negative for arthralgias, back pain, gait problem, myalgias and neck pain.   Skin: Negative for pallor, rash and wound.   Allergic/Immunologic: Negative for immunocompromised state.   Neurological: Negative for dizziness, seizures, weakness, light-headedness, numbness and headaches.   Psychiatric/Behavioral: Negative for agitation and behavioral problems. The patient is not nervous/anxious.        Objective     Visit Vitals   • /100   • Ht 72\" (182.9 cm)   • Wt 255 lb (116 kg)   • BMI 34.58 kg/m2       Physical Exam   Constitutional: He is oriented to person, place, and time. He appears well-developed and well-nourished.   HENT:   Head: Normocephalic and atraumatic.   Nose: Nose normal.   Eyes: Conjunctivae and EOM are normal. Right eye exhibits no discharge. Left eye exhibits no discharge.   Neck: Trachea normal, normal range of motion and phonation normal. Neck supple. No JVD present. No tracheal deviation and no edema present. No thyromegaly present.   Cardiovascular: Normal rate, regular rhythm and normal heart sounds.  Exam reveals no gallop and no friction rub.    No murmur heard.  Pulmonary/Chest: Effort normal and breath sounds normal. No accessory muscle usage. No respiratory distress. He has no decreased breath sounds. He has no wheezes. He has no rales. He exhibits no tenderness.   Abdominal: Soft. He exhibits no distension, no fluid wave, no ascites, no pulsatile midline mass and no mass. There is no tenderness. There is no " rebound and no guarding. A hernia is present.       Musculoskeletal: Normal range of motion. He exhibits no edema, tenderness or deformity.   Lymphadenopathy:     He has no cervical adenopathy.        Left: No supraclavicular adenopathy present.   Neurological: He is alert and oriented to person, place, and time. He has normal strength. No cranial nerve deficit.   Skin: Skin is warm and dry. No rash noted. He is not diaphoretic. No erythema. No pallor.   Psychiatric: He has a normal mood and affect. His speech is normal and behavior is normal. Judgment and thought content normal. Cognition and memory are normal.   Vitals reviewed.      DIAGNOSTIC DATA:    Preop labs ordered    ASSESSMENT:    Recurrent umbilical/incisional hernia    PLAN:    He would like his hernia repaired as he is concerned about it getting larger.  He plans to do this next month to give his workplace time to make scheduling adjustments.  Risks and benefits of open repair of recurrent umbilical (incisional) hernia with mesh were discussed with he and his wife and he wishes to proceed.  Scheduled for 2/6/17.

## 2017-02-06 NOTE — ANESTHESIA POSTPROCEDURE EVALUATION
Patient: Jovany Knutson    Procedure Summary     Date Anesthesia Start Anesthesia Stop Room / Location    02/06/17 1339 1454 [facility] OR 11 / [facility]       Procedure Diagnosis Surgeon Provider    OPEN REPAIR RECURRENT UMBILICAL HERNIA WITH MESH (N/A Abdomen) Hernia, incisional  (HERNIA , INCISIONAL ) MD Kg Henderson MD          Anesthesia Type: general  Last vitals  /83 (02/06/17 1507)    Temp 97.1 °F (36.2 °C) (02/06/17 1507)    Pulse 90 (02/06/17 1507)   Resp 22 (02/06/17 1507)    SpO2 96 % (02/06/17 1507)      Post Anesthesia Care and Evaluation    Patient location during evaluation: bedside  Patient participation: complete - patient participated  Level of consciousness: awake and alert  Pain score: 0  Pain management: adequate  Airway patency: patent  Anesthetic complications: No anesthetic complications  PONV Status: none  Cardiovascular status: hemodynamically stable and acceptable  Respiratory status: acceptable, room air and spontaneous ventilation  Hydration status: acceptable

## 2017-02-06 NOTE — PLAN OF CARE
Problem: Patient Care Overview (Adult)  Goal: Plan of Care Review  Outcome: Ongoing (interventions implemented as appropriate)    02/06/17 1505   Coping/Psychosocial Response Interventions   Plan Of Care Reviewed With patient   Patient Care Overview   Progress improving   Outcome Evaluation   Outcome Summary/Follow up Plan vss, dc per anesthesia protocol, tolerating po well         Problem: Perioperative Period (Adult)  Goal: Signs and Symptoms of Listed Potential Problems Will be Absent or Manageable (Perioperative Period)  Outcome: Ongoing (interventions implemented as appropriate)

## 2017-02-20 ENCOUNTER — OFFICE VISIT (OUTPATIENT)
Dept: SURGERY | Facility: CLINIC | Age: 37
End: 2017-02-20

## 2017-02-20 VITALS
HEIGHT: 72 IN | SYSTOLIC BLOOD PRESSURE: 142 MMHG | DIASTOLIC BLOOD PRESSURE: 82 MMHG | BODY MASS INDEX: 33.86 KG/M2 | WEIGHT: 250 LBS

## 2017-02-20 DIAGNOSIS — Z09 FOLLOW UP: Primary | ICD-10-CM

## 2017-02-20 PROCEDURE — 99024 POSTOP FOLLOW-UP VISIT: CPT | Performed by: SURGERY

## 2017-02-20 NOTE — PROGRESS NOTES
CHIEF COMPLAINT:    Chief Complaint   Patient presents with   • Post-op     S/P Open repair recurrent umbilical hernia with mesh on 2/6/17.       HISTORY OF PRESENT ILLNESS:    Jovany Knutson is a 36 y.o. male who underwent open repair of recurrent umbilical hernia on 2/6/17.  He returns today for follow up.  Says he has been having anxiety issues since he stopped smoking pot.  He has also modified his diet since stopping marijuana.  He notes no pain today. No abdominal issues.  Is eating and drinking well at home, having regular bowel function. Reports no fevers or chills.      EXAM:  Vitals:    02/20/17 0951   BP: 142/82         Incision above umbilicus healing well. No palpable hernia recurrence    ASSESSMENT:    S/p recurrent umbilical hernia repair    PLAN:    Overall doing well.  Did encourage him to establish a PCP.  Will see prn.          This document has been electronically signed by Emory Angeles MD on February 20, 2017 10:25 AM

## 2022-01-12 ENCOUNTER — APPOINTMENT (OUTPATIENT)
Dept: GENERAL RADIOLOGY | Facility: HOSPITAL | Age: 42
End: 2022-01-12

## 2022-01-12 ENCOUNTER — APPOINTMENT (OUTPATIENT)
Dept: ULTRASOUND IMAGING | Facility: HOSPITAL | Age: 42
End: 2022-01-12

## 2022-01-12 ENCOUNTER — HOSPITAL ENCOUNTER (INPATIENT)
Facility: HOSPITAL | Age: 42
LOS: 1 days | Discharge: HOME | End: 2022-01-13
Attending: EMERGENCY MEDICINE | Admitting: INTERNAL MEDICINE

## 2022-01-12 DIAGNOSIS — K85.90 ACUTE PANCREATITIS, UNSPECIFIED COMPLICATION STATUS, UNSPECIFIED PANCREATITIS TYPE: Primary | ICD-10-CM

## 2022-01-12 DIAGNOSIS — E11.65 TYPE 2 DIABETES MELLITUS WITH HYPERGLYCEMIA, WITHOUT LONG-TERM CURRENT USE OF INSULIN: ICD-10-CM

## 2022-01-12 PROBLEM — E11.9 TYPE 2 DIABETES MELLITUS: Status: ACTIVE | Noted: 2022-01-12

## 2022-01-12 LAB
ACETONE BLD QL: NEGATIVE
ALBUMIN SERPL-MCNC: 4.8 G/DL (ref 3.5–5.2)
ALBUMIN/GLOB SERPL: 2 G/DL
ALP SERPL-CCNC: 106 U/L (ref 39–117)
ALT SERPL W P-5'-P-CCNC: 45 U/L (ref 1–41)
ANION GAP SERPL CALCULATED.3IONS-SCNC: 10 MMOL/L (ref 5–15)
ARTERIAL PATENCY WRIST A: ABNORMAL
AST SERPL-CCNC: 22 U/L (ref 1–40)
ATMOSPHERIC PRESS: 749 MMHG
BASE EXCESS BLDA CALC-SCNC: 2.1 MMOL/L (ref 0–2)
BASOPHILS # BLD AUTO: 0.1 10*3/MM3 (ref 0–0.2)
BASOPHILS NFR BLD AUTO: 1 % (ref 0–1.5)
BDY SITE: ABNORMAL
BILIRUB SERPL-MCNC: 0.5 MG/DL (ref 0–1.2)
BILIRUB UR QL STRIP: NEGATIVE
BUN SERPL-MCNC: 10 MG/DL (ref 6–20)
BUN/CREAT SERPL: 12.7 (ref 7–25)
CALCIUM SPEC-SCNC: 9.3 MG/DL (ref 8.6–10.5)
CHLORIDE SERPL-SCNC: 93 MMOL/L (ref 98–107)
CLARITY UR: CLEAR
CO2 SERPL-SCNC: 27 MMOL/L (ref 22–29)
COLOR UR: YELLOW
CREAT SERPL-MCNC: 0.79 MG/DL (ref 0.76–1.27)
DEPRECATED RDW RBC AUTO: 38.7 FL (ref 37–54)
EOSINOPHIL # BLD AUTO: 0.19 10*3/MM3 (ref 0–0.4)
EOSINOPHIL NFR BLD AUTO: 1.9 % (ref 0.3–6.2)
ERYTHROCYTE [DISTWIDTH] IN BLOOD BY AUTOMATED COUNT: 12.1 % (ref 12.3–15.4)
ETHANOL BLD-MCNC: <10 MG/DL (ref 0–10)
ETHANOL UR QL: <0.01 %
FLUAV RNA RESP QL NAA+PROBE: NOT DETECTED
FLUBV RNA RESP QL NAA+PROBE: NOT DETECTED
GFR SERPL CREATININE-BSD FRML MDRD: 108 ML/MIN/1.73
GLOBULIN UR ELPH-MCNC: 2.4 GM/DL
GLUCOSE BLDC GLUCOMTR-MCNC: 350 MG/DL (ref 70–130)
GLUCOSE BLDC GLUCOMTR-MCNC: 403 MG/DL (ref 70–130)
GLUCOSE SERPL-MCNC: 493 MG/DL (ref 65–99)
GLUCOSE UR STRIP-MCNC: ABNORMAL MG/DL
HBA1C MFR BLD: 11 % (ref 4.8–5.6)
HCO3 BLDA-SCNC: 27.6 MMOL/L (ref 20–26)
HCT VFR BLD AUTO: 48.8 % (ref 37.5–51)
HGB BLD-MCNC: 18.1 G/DL (ref 13–17.7)
HGB UR QL STRIP.AUTO: NEGATIVE
IMM GRANULOCYTES # BLD AUTO: 0.03 10*3/MM3 (ref 0–0.05)
IMM GRANULOCYTES NFR BLD AUTO: 0.3 % (ref 0–0.5)
KETONES UR QL STRIP: NEGATIVE
LDH SERPL-CCNC: 228 U/L (ref 135–225)
LEUKOCYTE ESTERASE UR QL STRIP.AUTO: NEGATIVE
LIPASE SERPL-CCNC: 501 U/L (ref 13–60)
LYMPHOCYTES # BLD AUTO: 3.9 10*3/MM3 (ref 0.7–3.1)
LYMPHOCYTES NFR BLD AUTO: 39.7 % (ref 19.6–45.3)
Lab: ABNORMAL
MAGNESIUM SERPL-MCNC: 2.2 MG/DL (ref 1.6–2.6)
MCH RBC QN AUTO: 32.3 PG (ref 26.6–33)
MCHC RBC AUTO-ENTMCNC: 37.1 G/DL (ref 31.5–35.7)
MCV RBC AUTO: 87 FL (ref 79–97)
MODALITY: ABNORMAL
MONOCYTES # BLD AUTO: 0.6 10*3/MM3 (ref 0.1–0.9)
MONOCYTES NFR BLD AUTO: 6.1 % (ref 5–12)
NEUTROPHILS NFR BLD AUTO: 5 10*3/MM3 (ref 1.7–7)
NEUTROPHILS NFR BLD AUTO: 51 % (ref 42.7–76)
NITRITE UR QL STRIP: NEGATIVE
NRBC BLD AUTO-RTO: 0 /100 WBC (ref 0–0.2)
PCO2 BLDA: 44.6 MM HG (ref 35–45)
PH BLDA: 7.4 PH UNITS (ref 7.35–7.45)
PH UR STRIP.AUTO: 5.5 [PH] (ref 5–9)
PLAT MORPH BLD: NORMAL
PLATELET # BLD AUTO: 186 10*3/MM3 (ref 140–450)
PMV BLD AUTO: 12.5 FL (ref 6–12)
PO2 BLDA: 75.8 MM HG (ref 83–108)
POTASSIUM SERPL-SCNC: 4.4 MMOL/L (ref 3.5–5.2)
PROT SERPL-MCNC: 7.2 G/DL (ref 6–8.5)
PROT UR QL STRIP: NEGATIVE
RBC # BLD AUTO: 5.61 10*6/MM3 (ref 4.14–5.8)
RBC MORPH BLD: NORMAL
SAO2 % BLDCOA: 96.4 % (ref 94–99)
SARS-COV-2 RNA RESP QL NAA+PROBE: NOT DETECTED
SODIUM SERPL-SCNC: 130 MMOL/L (ref 136–145)
SP GR UR STRIP: 1.04 (ref 1–1.03)
TROPONIN T SERPL-MCNC: <0.01 NG/ML (ref 0–0.03)
UROBILINOGEN UR QL STRIP: ABNORMAL
VENTILATOR MODE: ABNORMAL
WBC MORPH BLD: NORMAL
WBC NRBC COR # BLD: 9.82 10*3/MM3 (ref 3.4–10.8)

## 2022-01-12 PROCEDURE — 93010 ELECTROCARDIOGRAM REPORT: CPT | Performed by: INTERNAL MEDICINE

## 2022-01-12 PROCEDURE — 93005 ELECTROCARDIOGRAM TRACING: CPT | Performed by: EMERGENCY MEDICINE

## 2022-01-12 PROCEDURE — 71045 X-RAY EXAM CHEST 1 VIEW: CPT

## 2022-01-12 PROCEDURE — 83690 ASSAY OF LIPASE: CPT | Performed by: EMERGENCY MEDICINE

## 2022-01-12 PROCEDURE — 82077 ASSAY SPEC XCP UR&BREATH IA: CPT | Performed by: EMERGENCY MEDICINE

## 2022-01-12 PROCEDURE — 83735 ASSAY OF MAGNESIUM: CPT | Performed by: EMERGENCY MEDICINE

## 2022-01-12 PROCEDURE — 83615 LACTATE (LD) (LDH) ENZYME: CPT | Performed by: EMERGENCY MEDICINE

## 2022-01-12 PROCEDURE — 82009 KETONE BODYS QUAL: CPT | Performed by: EMERGENCY MEDICINE

## 2022-01-12 PROCEDURE — 99284 EMERGENCY DEPT VISIT MOD MDM: CPT

## 2022-01-12 PROCEDURE — 87636 SARSCOV2 & INF A&B AMP PRB: CPT | Performed by: EMERGENCY MEDICINE

## 2022-01-12 PROCEDURE — 82962 GLUCOSE BLOOD TEST: CPT

## 2022-01-12 PROCEDURE — 82803 BLOOD GASES ANY COMBINATION: CPT

## 2022-01-12 PROCEDURE — 80053 COMPREHEN METABOLIC PANEL: CPT | Performed by: EMERGENCY MEDICINE

## 2022-01-12 PROCEDURE — 84484 ASSAY OF TROPONIN QUANT: CPT | Performed by: EMERGENCY MEDICINE

## 2022-01-12 PROCEDURE — 63710000001 INSULIN REGULAR HUMAN PER 5 UNITS: Performed by: EMERGENCY MEDICINE

## 2022-01-12 PROCEDURE — 85025 COMPLETE CBC W/AUTO DIFF WBC: CPT | Performed by: EMERGENCY MEDICINE

## 2022-01-12 PROCEDURE — 85007 BL SMEAR W/DIFF WBC COUNT: CPT | Performed by: EMERGENCY MEDICINE

## 2022-01-12 PROCEDURE — 83036 HEMOGLOBIN GLYCOSYLATED A1C: CPT | Performed by: HOSPITALIST

## 2022-01-12 PROCEDURE — 76705 ECHO EXAM OF ABDOMEN: CPT

## 2022-01-12 PROCEDURE — 36600 WITHDRAWAL OF ARTERIAL BLOOD: CPT

## 2022-01-12 PROCEDURE — 81003 URINALYSIS AUTO W/O SCOPE: CPT | Performed by: EMERGENCY MEDICINE

## 2022-01-12 RX ORDER — SODIUM CHLORIDE 9 MG/ML
125 INJECTION, SOLUTION INTRAVENOUS CONTINUOUS
Status: DISCONTINUED | OUTPATIENT
Start: 2022-01-12 | End: 2022-01-13 | Stop reason: HOSPADM

## 2022-01-12 RX ORDER — LANOLIN ALCOHOL/MO/W.PET/CERES
5 CREAM (GRAM) TOPICAL NIGHTLY PRN
Status: DISCONTINUED | OUTPATIENT
Start: 2022-01-12 | End: 2022-01-13 | Stop reason: HOSPADM

## 2022-01-12 RX ORDER — ACETAMINOPHEN 160 MG/5ML
650 SOLUTION ORAL EVERY 4 HOURS PRN
Status: DISCONTINUED | OUTPATIENT
Start: 2022-01-12 | End: 2022-01-13 | Stop reason: HOSPADM

## 2022-01-12 RX ORDER — SODIUM CHLORIDE 0.9 % (FLUSH) 0.9 %
10 SYRINGE (ML) INJECTION AS NEEDED
Status: DISCONTINUED | OUTPATIENT
Start: 2022-01-12 | End: 2022-01-13 | Stop reason: HOSPADM

## 2022-01-12 RX ORDER — ACETAMINOPHEN 325 MG/1
650 TABLET ORAL EVERY 4 HOURS PRN
Status: DISCONTINUED | OUTPATIENT
Start: 2022-01-12 | End: 2022-01-13 | Stop reason: HOSPADM

## 2022-01-12 RX ORDER — HYDROCODONE BITARTRATE AND ACETAMINOPHEN 5; 325 MG/1; MG/1
1 TABLET ORAL EVERY 4 HOURS PRN
Status: DISCONTINUED | OUTPATIENT
Start: 2022-01-12 | End: 2022-01-13 | Stop reason: HOSPADM

## 2022-01-12 RX ORDER — PANTOPRAZOLE SODIUM 40 MG/10ML
40 INJECTION, POWDER, LYOPHILIZED, FOR SOLUTION INTRAVENOUS ONCE
Status: COMPLETED | OUTPATIENT
Start: 2022-01-12 | End: 2022-01-12

## 2022-01-12 RX ORDER — NICOTINE POLACRILEX 4 MG
15 LOZENGE BUCCAL
Status: DISCONTINUED | OUTPATIENT
Start: 2022-01-12 | End: 2022-01-13 | Stop reason: HOSPADM

## 2022-01-12 RX ORDER — ONDANSETRON 2 MG/ML
4 INJECTION INTRAMUSCULAR; INTRAVENOUS EVERY 6 HOURS PRN
Status: DISCONTINUED | OUTPATIENT
Start: 2022-01-12 | End: 2022-01-13 | Stop reason: HOSPADM

## 2022-01-12 RX ORDER — DEXTROSE MONOHYDRATE 25 G/50ML
25 INJECTION, SOLUTION INTRAVENOUS
Status: DISCONTINUED | OUTPATIENT
Start: 2022-01-12 | End: 2022-01-13 | Stop reason: HOSPADM

## 2022-01-12 RX ORDER — ACETAMINOPHEN 650 MG/1
650 SUPPOSITORY RECTAL EVERY 4 HOURS PRN
Status: DISCONTINUED | OUTPATIENT
Start: 2022-01-12 | End: 2022-01-13 | Stop reason: HOSPADM

## 2022-01-12 RX ORDER — SODIUM CHLORIDE 0.9 % (FLUSH) 0.9 %
10 SYRINGE (ML) INJECTION EVERY 12 HOURS SCHEDULED
Status: DISCONTINUED | OUTPATIENT
Start: 2022-01-12 | End: 2022-01-13 | Stop reason: HOSPADM

## 2022-01-12 RX ORDER — ALUMINA, MAGNESIA, AND SIMETHICONE 2400; 2400; 240 MG/30ML; MG/30ML; MG/30ML
15 SUSPENSION ORAL EVERY 6 HOURS PRN
Status: DISCONTINUED | OUTPATIENT
Start: 2022-01-12 | End: 2022-01-13 | Stop reason: HOSPADM

## 2022-01-12 RX ORDER — ASPIRIN 81 MG/1
81 TABLET ORAL DAILY
Status: DISCONTINUED | OUTPATIENT
Start: 2022-01-13 | End: 2022-01-13 | Stop reason: HOSPADM

## 2022-01-12 RX ADMIN — PANTOPRAZOLE SODIUM 40 MG: 40 INJECTION, POWDER, FOR SOLUTION INTRAVENOUS at 18:38

## 2022-01-12 RX ADMIN — SODIUM CHLORIDE 2000 ML: 9 INJECTION, SOLUTION INTRAVENOUS at 16:26

## 2022-01-12 RX ADMIN — HUMAN INSULIN 10 UNITS: 100 INJECTION, SOLUTION SUBCUTANEOUS at 17:46

## 2022-01-12 RX ADMIN — SODIUM CHLORIDE 125 ML/HR: 9 INJECTION, SOLUTION INTRAVENOUS at 16:28

## 2022-01-12 RX ADMIN — Medication 10 ML: at 17:06

## 2022-01-12 NOTE — ED NOTES
Patient was seen at his PCP for dry mouth and increased need to void. Patient's FSBS at this time was 521. Patient does not have a history of diabetes.      Eugenie Armendariz RN  01/12/22 1525

## 2022-01-12 NOTE — ED PROVIDER NOTES
Subjective   42yo male presents ED c/o 2wk hx polyuria/polydipsia/fatigue/dry mouth.  Pt seen primary care clinic today and referred to ED for further evaluation secondary to severe hyperglycemia.  Pt denies fever/chills/chest pain/soa/cough/abd pain.  Pt endorses increased etoh intake over past 6 months.  Denies hx delirium tremens.      History provided by:  Patient and spouse  Illness  Severity:  Severe  Onset quality:  Gradual  Duration:  2 weeks  Chronicity:  New  Associated symptoms: fatigue        Review of Systems   Constitutional: Positive for fatigue.   HENT: Negative.    Respiratory: Negative.    Cardiovascular: Negative.    Gastrointestinal: Negative.    Endocrine: Positive for polydipsia and polyuria.   Genitourinary: Positive for frequency.   Musculoskeletal: Negative.    All other systems reviewed and are negative.      Past Medical History:   Diagnosis Date   • Acute bronchitis    • Diabetes mellitus screening    • Dull chest pain     Left chronic chest pain- for 2 years.         • Familial polycythemia     Pt. states he is supposed to ets phlebotomy [id number] per family  but he has only gave blood 1 time in the past year.   • Heavy tobacco smoker     18 year hx of 1-2 ppd.    • Screening for deficiency anemia    • Screening for hyperlipidemia    • Thyroid disorder screening        No Known Allergies    Past Surgical History:   Procedure Laterality Date   • ANKLE OPEN REDUCTION INTERNAL FIXATION     • HERNIA REPAIR     • MANDIBLE FRACTURE SURGERY     • THUMB AMPUTATION      Amputation, finger/thumb (Rt. thumb)   • TOTAL KNEE ARTHROPLASTY      PATIENT DENIES   • UMBILICAL HERNIA REPAIR     • UMBILICAL HERNIA REPAIR N/A 2/6/2017    Procedure: OPEN REPAIR RECURRENT UMBILICAL HERNIA WITH MESH;  Surgeon: Emory Angeles MD;  Location: [facility];  Service:        History reviewed. No pertinent family history.    Social History     Socioeconomic History   • Marital status:    Tobacco Use    • Smoking status: Current Every Day Smoker     Packs/day: 1.50   • Smokeless tobacco: Never Used   Substance and Sexual Activity   • Alcohol use: Yes     Comment: ON OCC.   • Drug use: Yes     Types: Marijuana     Comment: LAST USED 1-   • Sexual activity: Yes     Partners: Female     Birth control/protection: None           Objective   Physical Exam  Vitals and nursing note reviewed.   Constitutional:       Appearance: Normal appearance.   HENT:      Head: Normocephalic and atraumatic.      Mouth/Throat:      Mouth: Mucous membranes are moist.   Eyes:      Pupils: Pupils are equal, round, and reactive to light.   Cardiovascular:      Rate and Rhythm: Normal rate and regular rhythm.      Pulses: Normal pulses.      Heart sounds: Normal heart sounds. No murmur heard.  No friction rub. No gallop.    Pulmonary:      Effort: Pulmonary effort is normal. No respiratory distress.      Breath sounds: Normal breath sounds. No wheezing, rhonchi or rales.   Abdominal:      General: Bowel sounds are normal. There is no distension.      Palpations: Abdomen is soft.      Tenderness: There is no abdominal tenderness. There is no guarding or rebound.   Musculoskeletal:         General: Normal range of motion.      Cervical back: Normal range of motion and neck supple. No rigidity.   Lymphadenopathy:      Cervical: No cervical adenopathy.   Skin:     General: Skin is warm and dry.   Neurological:      General: No focal deficit present.      Mental Status: He is alert and oriented to person, place, and time.      GCS: GCS eye subscore is 4. GCS verbal subscore is 5. GCS motor subscore is 6.         ECG 12 Lead      Date/Time: 1/12/2022 4:49 PM  Performed by: Bright Bruce MD  Authorized by: Bright Bruce MD   Interpreted by physician  Rhythm: sinus rhythm  Rate: normal  BPM: 92  QRS axis: normal  Conduction: conduction normal  ST Segments: ST segments normal  T flattening: aVL  Other: no other findings  Clinical  impression: non-specific ECG                 ED Course      Labs Reviewed   COMPREHENSIVE METABOLIC PANEL - Abnormal; Notable for the following components:       Result Value    Glucose 493 (*)     Sodium 130 (*)     Chloride 93 (*)     ALT (SGPT) 45 (*)     All other components within normal limits    Narrative:     GFR Normal >60  Chronic Kidney Disease <60  Kidney Failure <15     LIPASE - Abnormal; Notable for the following components:    Lipase 501 (*)     All other components within normal limits   CBC WITH AUTO DIFFERENTIAL - Abnormal; Notable for the following components:    Hemoglobin 18.1 (*)     MCHC 37.1 (*)     RDW 12.1 (*)     MPV 12.5 (*)     Lymphocytes, Absolute 3.90 (*)     All other components within normal limits   BLOOD GAS, ARTERIAL - Abnormal; Notable for the following components:    pO2, Arterial 75.8 (*)     HCO3, Arterial 27.6 (*)     Base Excess, Arterial 2.1 (*)     All other components within normal limits   LACTATE DEHYDROGENASE - Abnormal; Notable for the following components:     (*)     All other components within normal limits   MAGNESIUM - Normal   ACETONE - Normal   TROPONIN (IN-HOUSE) - Normal    Narrative:     Troponin T Reference Range:  <= 0.03 ng/mL-   Negative for AMI  >0.03 ng/mL-     Abnormal for myocardial necrosis.  Clinicians would have to utilize clinical acumen, EKG, Troponin and serial changes to determine if it is an Acute Myocardial Infarction or myocardial injury due to an underlying chronic condition.       Results may be falsely decreased if patient taking Biotin.     COVID-19 AND FLU A/B, NP SWAB IN TRANSPORT MEDIA 8-12 HR TAT   ETHANOL   URINALYSIS W/ MICROSCOPIC IF INDICATED (NO CULTURE)   BLOOD GAS, ARTERIAL   SCAN SLIDE   CBC AND DIFFERENTIAL    Narrative:     The following orders were created for panel order CBC & Differential.  Procedure                               Abnormality         Status                     ---------                                -----------         ------                     CBC Auto Differential[[id number]]        Abnormal            Final result               Scan Slide[[id number]]                                       In process                   Please view results for these tests on the individual orders.     US Gallbladder    Result Date: 1/12/2022  Narrative: PROCEDURE:   US Abdomen Limited, Right Upper Quadrant CLINICAL INDICATION:   pancreatitis TECHNIQUE:   Real-time ultrasound of the right upper quadrant with image documentation. COMPARISON:   No relevant prior studies available. FINDINGS:   LIVER:  Liver parenchyma appears echogenic relative to the renal cortex, suggesting fatty infiltration. No intrahepatic bile duct dilation. Patent portal vein with hepatopetal flow.   GALLBLADDER:  Gallbladder contracted despite reported NPO status, limiting evaluation. No gallbladder wall thickening. Sonographic Gerber's sign not evaluated; a positive sign would support acute cholecystitis.   COMMON BILE DUCT:  Unremarkable as visualized.  No stones.  No dilation.   PANCREAS:  Pancreas obscured by shadowing bowel gas.   RIGHT KIDNEY:  Unremarkable.  No hydronephrosis.     Impression:   Limited evaluation of the gallbladder due to contraction. No definite sonographic evidence of cholecystitis.   Pancreas obscured by shadowing bowel gas. Electronically signed by:  Radha Stockton MD  1/12/2022 6:10 PM CST Workstation: [id number]    XR Chest 1 View    Result Date: 1/12/2022  Narrative: PROCEDURE:   XR Chest, 1 View CLINICAL INDICATION:   dka TECHNIQUE:   Frontal view of the chest. COMPARISON: 2/1/2017 FINDINGS:   LUNGS: Similar coarse interstitial lung markings which can be chronic change.  No focal consolidation.   PLEURAL SPACE:  Unremarkable.  No visible pneumothorax.   HEART:  Unremarkable.  Cardiac silhouette within normal limits.   MEDIASTINUM:  Unremarkable.   BONES/JOINTS:  Unremarkable.     Impression:   No acute findings in the  chest. Electronically signed by:  Radha Stockton MD  1/12/2022 6:05 PM CST Workstation: [id number]                                               MDM    Final diagnoses:   Acute pancreatitis, unspecified complication status, unspecified pancreatitis type   Type 2 diabetes mellitus with hyperglycemia, without long-term current use of insulin (HCC)       ED Disposition  ED Disposition     ED Disposition Condition Comment    Decision to Admit  Level of Care: Med/Surg [1]   Admitting Physician: KAYE MAGANA [[id number]]   Attending Physician: KAYE MAGANA [[id number]]   Patient Class: Inpatient [101]            No follow-up provider specified.       Medication List      No changes were made to your prescriptions during this visit.          Bright Bruce MD  01/12/22 8049

## 2022-01-13 VITALS
RESPIRATION RATE: 16 BRPM | HEIGHT: 72 IN | WEIGHT: 262.7 LBS | DIASTOLIC BLOOD PRESSURE: 87 MMHG | SYSTOLIC BLOOD PRESSURE: 134 MMHG | TEMPERATURE: 97 F | HEART RATE: 60 BPM | BODY MASS INDEX: 35.58 KG/M2 | OXYGEN SATURATION: 98 %

## 2022-01-13 LAB
ALBUMIN SERPL-MCNC: 3.8 G/DL (ref 3.5–5.2)
ALBUMIN/GLOB SERPL: 2 G/DL
ALP SERPL-CCNC: 80 U/L (ref 39–117)
ALT SERPL W P-5'-P-CCNC: 37 U/L (ref 1–41)
ANION GAP SERPL CALCULATED.3IONS-SCNC: 7 MMOL/L (ref 5–15)
AST SERPL-CCNC: 21 U/L (ref 1–40)
BASOPHILS # BLD AUTO: 0.07 10*3/MM3 (ref 0–0.2)
BASOPHILS NFR BLD AUTO: 0.8 % (ref 0–1.5)
BILIRUB SERPL-MCNC: 0.4 MG/DL (ref 0–1.2)
BUN SERPL-MCNC: 11 MG/DL (ref 6–20)
BUN/CREAT SERPL: 14.9 (ref 7–25)
CALCIUM SPEC-SCNC: 8.1 MG/DL (ref 8.6–10.5)
CHLORIDE SERPL-SCNC: 104 MMOL/L (ref 98–107)
CHOLEST SERPL-MCNC: 265 MG/DL (ref 0–200)
CO2 SERPL-SCNC: 27 MMOL/L (ref 22–29)
CREAT SERPL-MCNC: 0.74 MG/DL (ref 0.76–1.27)
DEPRECATED RDW RBC AUTO: 38.5 FL (ref 37–54)
EOSINOPHIL # BLD AUTO: 0.19 10*3/MM3 (ref 0–0.4)
EOSINOPHIL NFR BLD AUTO: 2.3 % (ref 0.3–6.2)
ERYTHROCYTE [DISTWIDTH] IN BLOOD BY AUTOMATED COUNT: 12.2 % (ref 12.3–15.4)
GFR SERPL CREATININE-BSD FRML MDRD: 116 ML/MIN/1.73
GLOBULIN UR ELPH-MCNC: 1.9 GM/DL
GLUCOSE BLDC GLUCOMTR-MCNC: 275 MG/DL (ref 70–130)
GLUCOSE BLDC GLUCOMTR-MCNC: 282 MG/DL (ref 70–130)
GLUCOSE BLDC GLUCOMTR-MCNC: 521 MG/DL (ref 70–130)
GLUCOSE SERPL-MCNC: 293 MG/DL (ref 65–99)
HCT VFR BLD AUTO: 46.1 % (ref 37.5–51)
HDLC SERPL-MCNC: 25 MG/DL (ref 40–60)
HGB BLD-MCNC: 16.6 G/DL (ref 13–17.7)
IMM GRANULOCYTES # BLD AUTO: 0.03 10*3/MM3 (ref 0–0.05)
IMM GRANULOCYTES NFR BLD AUTO: 0.4 % (ref 0–0.5)
LDLC SERPL CALC-MCNC: 142 MG/DL (ref 0–100)
LDLC/HDLC SERPL: 5.43 {RATIO}
LYMPHOCYTES # BLD AUTO: 2.98 10*3/MM3 (ref 0.7–3.1)
LYMPHOCYTES NFR BLD AUTO: 35.7 % (ref 19.6–45.3)
MAGNESIUM SERPL-MCNC: 2.2 MG/DL (ref 1.6–2.6)
MCH RBC QN AUTO: 31.2 PG (ref 26.6–33)
MCHC RBC AUTO-ENTMCNC: 36 G/DL (ref 31.5–35.7)
MCV RBC AUTO: 86.7 FL (ref 79–97)
MONOCYTES # BLD AUTO: 0.55 10*3/MM3 (ref 0.1–0.9)
MONOCYTES NFR BLD AUTO: 6.6 % (ref 5–12)
NEUTROPHILS NFR BLD AUTO: 4.53 10*3/MM3 (ref 1.7–7)
NEUTROPHILS NFR BLD AUTO: 54.2 % (ref 42.7–76)
NRBC BLD AUTO-RTO: 0 /100 WBC (ref 0–0.2)
PHOSPHATE SERPL-MCNC: 2.8 MG/DL (ref 2.5–4.5)
PLATELET # BLD AUTO: 149 10*3/MM3 (ref 140–450)
PMV BLD AUTO: 12.5 FL (ref 6–12)
POTASSIUM SERPL-SCNC: 4 MMOL/L (ref 3.5–5.2)
PROCALCITONIN SERPL-MCNC: 0.07 NG/ML (ref 0–0.25)
PROT SERPL-MCNC: 5.7 G/DL (ref 6–8.5)
RBC # BLD AUTO: 5.32 10*6/MM3 (ref 4.14–5.8)
SODIUM SERPL-SCNC: 138 MMOL/L (ref 136–145)
T4 FREE SERPL-MCNC: 1.5 NG/DL (ref 0.93–1.7)
TRIGL SERPL-MCNC: 521 MG/DL (ref 0–150)
TSH SERPL DL<=0.05 MIU/L-ACNC: 1.63 UIU/ML (ref 0.27–4.2)
VLDLC SERPL-MCNC: 98 MG/DL (ref 5–40)
WBC NRBC COR # BLD: 8.35 10*3/MM3 (ref 3.4–10.8)

## 2022-01-13 PROCEDURE — 84443 ASSAY THYROID STIM HORMONE: CPT | Performed by: HOSPITALIST

## 2022-01-13 PROCEDURE — 63710000001 INSULIN ASPART PER 5 UNITS: Performed by: HOSPITALIST

## 2022-01-13 PROCEDURE — 63710000001 INSULIN DETEMIR PER 5 UNITS: Performed by: HOSPITALIST

## 2022-01-13 PROCEDURE — 80061 LIPID PANEL: CPT | Performed by: HOSPITALIST

## 2022-01-13 PROCEDURE — 80053 COMPREHEN METABOLIC PANEL: CPT | Performed by: HOSPITALIST

## 2022-01-13 PROCEDURE — 84100 ASSAY OF PHOSPHORUS: CPT | Performed by: HOSPITALIST

## 2022-01-13 PROCEDURE — 84145 PROCALCITONIN (PCT): CPT | Performed by: HOSPITALIST

## 2022-01-13 PROCEDURE — 83735 ASSAY OF MAGNESIUM: CPT | Performed by: HOSPITALIST

## 2022-01-13 PROCEDURE — 85025 COMPLETE CBC W/AUTO DIFF WBC: CPT | Performed by: HOSPITALIST

## 2022-01-13 PROCEDURE — 84439 ASSAY OF FREE THYROXINE: CPT | Performed by: HOSPITALIST

## 2022-01-13 PROCEDURE — 82962 GLUCOSE BLOOD TEST: CPT

## 2022-01-13 RX ORDER — BLOOD-GLUCOSE METER
1 KIT MISCELLANEOUS AS NEEDED
Qty: 1 EACH | Refills: 0 | Status: SHIPPED | OUTPATIENT
Start: 2022-01-13 | End: 2023-01-13

## 2022-01-13 RX ORDER — LANCETS 28 GAUGE
EACH MISCELLANEOUS
Qty: 100 EACH | Refills: 0 | Status: SHIPPED | OUTPATIENT
Start: 2022-01-13

## 2022-01-13 RX ADMIN — SODIUM CHLORIDE 125 ML/HR: 9 INJECTION, SOLUTION INTRAVENOUS at 07:07

## 2022-01-13 RX ADMIN — ASPIRIN 81 MG: 81 TABLET, FILM COATED ORAL at 08:22

## 2022-01-13 RX ADMIN — INSULIN DETEMIR 20 UNITS: 100 INJECTION, SOLUTION SUBCUTANEOUS at 12:45

## 2022-01-13 RX ADMIN — INSULIN ASPART 4 UNITS: 100 INJECTION, SOLUTION INTRAVENOUS; SUBCUTANEOUS at 08:23

## 2022-01-13 NOTE — PLAN OF CARE
Problem: Adult Inpatient Plan of Care  Goal: Plan of Care Review  1/12/2022 2215 by Lauren Weeks RN  Outcome: Ongoing, Progressing  1/12/2022 2213 by Lauren Weeks RN  Outcome: Ongoing, Progressing  1/12/2022 2212 by Lauren Weeks RN  Outcome: Ongoing, Progressing  Goal: Patient-Specific Goal (Individualized)  1/12/2022 2215 by Lauren Weeks RN  Outcome: Ongoing, Progressing  1/12/2022 2213 by Lauren Weeks RN  Outcome: Ongoing, Progressing  1/12/2022 2212 by Lauren Weeks RN  Outcome: Ongoing, Progressing  Goal: Absence of Hospital-Acquired Illness or Injury  1/12/2022 2215 by Lauren Weeks RN  Outcome: Ongoing, Progressing  1/12/2022 2213 by Lauren Weeks RN  Outcome: Ongoing, Progressing  1/12/2022 2212 by Lauren Weeks RN  Outcome: Ongoing, Progressing  Goal: Optimal Comfort and Wellbeing  1/12/2022 2215 by Lauren Weeks RN  Outcome: Ongoing, Progressing  1/12/2022 2213 by Lauren Weeks RN  Outcome: Ongoing, Progressing  1/12/2022 2212 by Lauren Weeks RN  Outcome: Ongoing, Progressing  Goal: Readiness for Transition of Care  1/12/2022 2215 by Lauren Weeks RN  Outcome: Ongoing, Progressing  1/12/2022 2213 by Lauren Weeks RN  Outcome: Ongoing, Progressing  1/12/2022 2212 by Lauren Weeks RN  Outcome: Ongoing, Progressing  Intervention: Mutually Develop Transition Plan  Recent Flowsheet Documentation  Taken 1/12/2022 2208 by Lauren Weeks RN  Transportation Anticipated: family or friend will provide  Patient/Family Anticipated Services at Transition: none  Patient/Family Anticipates Transition to: home  Taken 1/12/2022 1936 by Lauren Weeks RN  Equipment Currently Used at Home: none  Taken 1/12/2022 1935 by Lauren Weeks RN  Equipment Currently Used at Home: other (see comments)     Problem: Fall Injury Risk  Goal: Absence of Fall and Fall-Related Injury  Outcome: Ongoing, Progressing   Goal Outcome Evaluation:

## 2022-01-13 NOTE — PLAN OF CARE
Problem: Adult Inpatient Plan of Care  Goal: Plan of Care Review  Outcome: Ongoing, Progressing     Problem: Adult Inpatient Plan of Care  Goal: Optimal Comfort and Wellbeing  Outcome: Ongoing, Progressing   Goal Outcome Evaluation:

## 2022-01-13 NOTE — H&P
[facility] Medicine Admission      Date of Admission: 1/12/2022      Primary Care Physician: Provider, No Known      Chief Complaint: Excessive drinking, thirst    HPI:    41-year-old male presented to the emergency room for evaluation of about 1 week history of polydipsia, polyuria and weight loss.  He checked his blood glucose at home and it was over 600.      Concurrent Medical History:  has a past medical history of Acute bronchitis, Diabetes mellitus screening, Dull chest pain, Familial polycythemia, Heavy tobacco smoker, Screening for deficiency anemia, Screening for hyperlipidemia, and Thyroid disorder screening.    Past Surgical History:  has a past surgical history that includes Thumb amputation; Total knee arthroplasty; Umbilical hernia repair; Hernia repair; Ankle fracture surgery; Mandible fracture surgery; and Umbilical hernia repair (N/A, 2/6/2017).    Family History:     No family history of premature CAD.  Mother and father had diabetes mellitus.    Social History:  reports that he has been smoking. He has been smoking about 1.50 packs per day. He has never used smokeless tobacco. He reports current alcohol use. He reports current drug use. Drug: Marijuana.    Allergies: No Known Allergies    Medications:   Prior to Admission medications    Medication Sig Start Date End Date Taking? Authorizing Provider   aspirin 81 MG EC tablet Take 1 tablet by mouth Daily. 2/1/17   Saman Cruz MD   HYDROcodone-acetaminophen (NORCO) 5-325 MG per tablet Take 1-2 tablets by mouth Every 4 (Four) Hours As Needed (Pain). 2/6/17   Emory Angeles MD       Review of Systems:  As per history of present illness and a complete 12 point review of systems is otherwise negative.     Physical Exam:   Temp:  [98 °F (36.7 °C)] 98 °F (36.7 °C)  Heart Rate:  [] 84  Resp:  [16] 16  BP: (162-176)/() 162/101    Gen.: Appears as stated age.  No acute distress.  HEENT:  EOMI.  PERRLA.  Sclerae anicteric.  Moist mucous membranes.  Neck: Supple.  No JVD.  No thyromegaly.  Chest: Clear to auscultation bilaterally.  No wheeze, rhonchi or rales.  Heart: Regular rhythm and rate.  No murmurs, rubs or gallops.  Abdomen: Normoactive bowel sounds.  Nontender.  Nondistended.  No guarding.   Neurological: Cranial nerves II through XII are grossly intact.  No cerebellar signs.   Extremities: Good range of motion throughout.  No cyanosis, clubbing or edema.  Skin: Warm.  No rashes.  No ulcers.  Psychiatry: Cooperative.        Results Reviewed:  I have personally reviewed current lab, radiology, and data and agree with results.  Lab Results (last 24 hours)     Procedure Component Value Units Date/Time    Urinalysis With Microscopic If Indicated (No Culture) - Urine, Clean Catch [[id number]]  (Abnormal) Collected: 01/12/22 1828    Specimen: Urine, Clean Catch Updated: 01/12/22 1840     Color, UA Yellow     Appearance, UA Clear     pH, UA 5.5     Specific Gravity, UA 1.043     Comment: Result obtained by Refractometer        Glucose, UA >=1000 mg/dL (3+)     Ketones, UA Negative     Bilirubin, UA Negative     Blood, UA Negative     Protein, UA Negative     Leuk Esterase, UA Negative     Nitrite, UA Negative     Urobilinogen, UA 0.2 E.U./dL    Narrative:      Urine microscopic not indicated.    COVID-19 and FLU A/B PCR - Swab, Nasopharynx [[id number]]  (Normal) Collected: 01/12/22 1644    Specimen: Swab from Nasopharynx Updated: 01/12/22 1831     COVID19 Not Detected     Influenza A PCR Not Detected     Influenza B PCR Not Detected    Narrative:      Fact sheet for providers: https://www.fda.gov/media/530127/download    Fact sheet for patients: https://www.fda.gov/media/653292/download    Test performed by PCR.    Lactate Dehydrogenase [[id number]]  (Abnormal) Collected: 01/12/22 1622    Specimen: Blood Updated: 01/12/22 1718      U/L      Comment: Specimen hemolyzed.  Results may be affected.        Lipase [[id number]]  (Abnormal) Collected: 01/12/22 1622    Specimen: Blood Updated: 01/12/22 1656     Lipase 501 U/L     Comprehensive Metabolic Panel [[id number]]  (Abnormal) Collected: 01/12/22 1622    Specimen: Blood Updated: 01/12/22 1654     Glucose 493 mg/dL      BUN 10 mg/dL      Creatinine 0.79 mg/dL      Sodium 130 mmol/L      Potassium 4.4 mmol/L      Chloride 93 mmol/L      CO2 27.0 mmol/L      Calcium 9.3 mg/dL      Total Protein 7.2 g/dL      Albumin 4.80 g/dL      ALT (SGPT) 45 U/L      AST (SGOT) 22 U/L      Alkaline Phosphatase 106 U/L      Total Bilirubin 0.5 mg/dL      eGFR Non African Amer 108 mL/min/1.73      Globulin 2.4 gm/dL      A/G Ratio 2.0 g/dL      BUN/Creatinine Ratio 12.7     Anion Gap 10.0 mmol/L     Narrative:      GFR Normal >60  Chronic Kidney Disease <60  Kidney Failure <15      CBC & Differential [[id number]]  (Abnormal) Collected: 01/12/22 1622    Specimen: Blood Updated: 01/12/22 1654    Narrative:      The following orders were created for panel order CBC & Differential.  Procedure                               Abnormality         Status                     ---------                               -----------         ------                     CBC Auto Differential[[id number]]        Abnormal            Final result               Scan Slide[[id number]]                                       In process                   Please view results for these tests on the individual orders.    CBC Auto Differential [[id number]]  (Abnormal) Collected: 01/12/22 1622    Specimen: Blood Updated: 01/12/22 1654     WBC 9.82 10*3/mm3      RBC 5.61 10*6/mm3      Hemoglobin 18.1 g/dL      Hematocrit 48.8 %      MCV 87.0 fL      MCH 32.3 pg      MCHC 37.1 g/dL      RDW 12.1 %      RDW-SD 38.7 fl      MPV 12.5 fL      Platelets 186 10*3/mm3      Neutrophil % 51.0 %      Lymphocyte % 39.7 %      Monocyte % 6.1 %      Eosinophil % 1.9 %      Basophil % 1.0 %      Immature Grans % 0.3 %      Neutrophils,  Absolute 5.00 10*3/mm3      Lymphocytes, Absolute 3.90 10*3/mm3      Monocytes, Absolute 0.60 10*3/mm3      Eosinophils, Absolute 0.19 10*3/mm3      Basophils, Absolute 0.10 10*3/mm3      Immature Grans, Absolute 0.03 10*3/mm3      nRBC 0.0 /100 WBC     Scan Slide [[id number]] Collected: 01/12/22 1622    Specimen: Blood Updated: 01/12/22 1651    Ethanol [[id number]] Collected: 01/12/22 1622    Specimen: Blood Updated: 01/12/22 1648     Ethanol <10 mg/dL      Ethanol % <0.010 %     Magnesium [[id number]]  (Normal) Collected: 01/12/22 1622    Specimen: Blood Updated: 01/12/22 1648     Magnesium 2.2 mg/dL     Troponin [[id number]]  (Normal) Collected: 01/12/22 1622    Specimen: Blood Updated: 01/12/22 1647     Troponin T <0.010 ng/mL     Narrative:      Troponin T Reference Range:  <= 0.03 ng/mL-   Negative for AMI  >0.03 ng/mL-     Abnormal for myocardial necrosis.  Clinicians would have to utilize clinical acumen, EKG, Troponin and serial changes to determine if it is an Acute Myocardial Infarction or myocardial injury due to an underlying chronic condition.       Results may be falsely decreased if patient taking Biotin.      Blood Gas, Arterial - [[id number]]  (Abnormal) Collected: 01/12/22 1640    Specimen: Arterial Blood Updated: 01/12/22 1644     Site Left Brachial     Flaco's Test N/A     pH, Arterial 7.399 pH units      pCO2, Arterial 44.6 mm Hg      pO2, Arterial 75.8 mm Hg      Comment: 84 Value below reference range        HCO3, Arterial 27.6 mmol/L      Comment: 83 Value above reference range        Base Excess, Arterial 2.1 mmol/L      Comment: 83 Value above reference range        O2 Saturation, Arterial 96.4 %      Barometric Pressure for Blood Gas 749 mmHg      Modality Room Air     Ventilator Mode NA     Collected by RESPIRATORY     Comment: Meter: [id number]     :  [id number]       Acetone [[id number]]  (Normal) Collected: 01/12/22 1622    Specimen: Blood Updated: 01/12/22 1639     Acetone  Negative        Imaging Results (Last 24 Hours)     Procedure Component Value Units Date/Time    US Gallbladder [[id number]] Collected: 01/12/22 1654     Updated: 01/12/22 1812    Narrative:      PROCEDURE:    US Abdomen Limited, Right Upper Quadrant    CLINICAL INDICATION:    pancreatitis    TECHNIQUE:    Real-time ultrasound of the right upper quadrant with image  documentation.    COMPARISON:    No relevant prior studies available.    FINDINGS:    LIVER:  Liver parenchyma appears echogenic relative to the  renal cortex, suggesting fatty infiltration. No intrahepatic bile  duct dilation. Patent portal vein with hepatopetal flow.    GALLBLADDER:  Gallbladder contracted despite reported NPO  status, limiting evaluation. No gallbladder wall thickening.  Sonographic Gerber's sign not evaluated; a positive sign would  support acute cholecystitis.    COMMON BILE DUCT:  Unremarkable as visualized.  No stones.  No  dilation.    PANCREAS:  Pancreas obscured by shadowing bowel gas.     RIGHT KIDNEY:  Unremarkable.  No hydronephrosis.      Impression:        Limited evaluation of the gallbladder due to contraction. No  definite sonographic evidence of cholecystitis.    Pancreas obscured by shadowing bowel gas.     Electronically signed by:  Radha Stockton MD  1/12/2022 6:10 PM  CST Workstation: [id number]    XR Chest 1 View [[id number]] Collected: 01/12/22 1643     Updated: 01/12/22 1806    Narrative:      PROCEDURE:    XR Chest, 1 View    CLINICAL INDICATION:    dka    TECHNIQUE:    Frontal view of the chest.    COMPARISON:  2/1/2017    FINDINGS:    LUNGS: Similar coarse interstitial lung markings which can be  chronic change.  No focal consolidation.     PLEURAL SPACE:  Unremarkable.  No visible pneumothorax.    HEART:  Unremarkable.  Cardiac silhouette within normal limits.    MEDIASTINUM:  Unremarkable.    BONES/JOINTS:  Unremarkable.      Impression:        No acute findings in the chest.    Electronically signed by:   Radha Stockton MD  1/12/2022 6:05 PM  CST Workstation: [id number]            Assessment and Plan:    Principal Problem:    Type 2 diabetes mellitus (HCC)    New onset diabetes mellitus:  -Continue IV fluids  -Continue ISS  -Check A1c and lipid panel    Tobacco use:  -Nicotine patch        Guilherme Lopez DO  01/12/22  19:27 CST

## 2022-01-13 NOTE — DISCHARGE SUMMARY
[facility] Medicine [facility]  DISCHARGE SUMMARY       Date of Admission: 1/12/2022  Date of Discharge:  1/13/2022  Primary Care Physician: Provider, No Known    Presenting Problem/History of Present Illness:  Type 2 diabetes mellitus with hyperglycemia, without long-term current use of insulin (HCC) [E11.65]  Acute pancreatitis, unspecified complication status, unspecified pancreatitis type [K85.90]       Final Discharge Diagnoses:  Active Hospital Problems    Diagnosis    • **Type 2 diabetes mellitus (HCC)        Consults:   Consults     Date and Time Order Name Status Description    1/12/2022  6:17 PM Hospitalist (on-call MD unless specified)            Procedures Performed:                 Pertinent Test Results:   Imaging Results (Last 7 Days)     Procedure Component Value Units Date/Time    US Gallbladder [[id number]] Collected: 01/12/22 1654     Updated: 01/12/22 2208    Narrative:      PROCEDURE:    US Abdomen Limited, Right Upper Quadrant    CLINICAL INDICATION:    pancreatitis    TECHNIQUE:    Real-time ultrasound of the right upper quadrant with image  documentation.    COMPARISON:    No relevant prior studies available.    FINDINGS:    LIVER:  Liver parenchyma appears echogenic relative to the  renal cortex, suggesting fatty infiltration. No intrahepatic bile  duct dilation. Patent portal vein with hepatopetal flow.    GALLBLADDER:  Gallbladder contracted despite reported NPO  status, limiting evaluation. No gallbladder wall thickening.  Sonographic Gerber's sign not evaluated; a positive sign would  support acute cholecystitis.    COMMON BILE DUCT:  Unremarkable as visualized.  No stones.  No  dilation.    PANCREAS:  Pancreas obscured by shadowing bowel gas.     RIGHT KIDNEY:  Unremarkable.  No hydronephrosis.      Impression:        Limited evaluation of the gallbladder due to contraction. No  definite sonographic evidence of cholecystitis.    Pancreas obscured  by shadowing bowel gas.     Electronically signed by:  Radha Stockton MD  1/12/2022 6:10 PM  CST Workstation: [id number]    XR Chest 1 View [[id number]] Collected: 01/12/22 1643     Updated: 01/12/22 1806    Narrative:      PROCEDURE:    XR Chest, 1 View    CLINICAL INDICATION:    dka    TECHNIQUE:    Frontal view of the chest.    COMPARISON:  2/1/2017    FINDINGS:    LUNGS: Similar coarse interstitial lung markings which can be  chronic change.  No focal consolidation.     PLEURAL SPACE:  Unremarkable.  No visible pneumothorax.    HEART:  Unremarkable.  Cardiac silhouette within normal limits.    MEDIASTINUM:  Unremarkable.    BONES/JOINTS:  Unremarkable.      Impression:        No acute findings in the chest.    Electronically signed by:  Radha Stockton MD  1/12/2022 6:05 PM  CST Workstation: [id number]        Lab Results (last 24 hours)     Procedure Component Value Units Date/Time    POC Glucose Once [[id number]]  (Abnormal) Collected: 01/13/22 1126    Specimen: Blood Updated: 01/13/22 1140     Glucose 275 mg/dL      Comment: RN NotifiedOperator: [id number] YULI KATERINEMeter ID: [id number]       TSH [[id number]]  (Normal) Collected: 01/13/22 0704    Specimen: Blood Updated: 01/13/22 0828     TSH 1.630 uIU/mL     T4, Free [[id number]]  (Normal) Collected: 01/13/22 0704    Specimen: Blood Updated: 01/13/22 0828     Free T4 1.50 ng/dL     Narrative:      Results may be falsely increased if patient taking Biotin.      Procalcitonin [[id number]]  (Normal) Collected: 01/13/22 0704    Specimen: Blood Updated: 01/13/22 0821     Procalcitonin 0.07 ng/mL     Narrative:      As a Marker for Sepsis (Non-Neonates):     1. <0.5 ng/mL represents a low risk of severe sepsis and/or septic shock.  2. >2 ng/mL represents a high risk of severe sepsis and/or septic shock.    As a Marker for Lower Respiratory Tract Infections that require antibiotic therapy:  PCT on Admission     Antibiotic Therapy             6-12 Hrs later  >0.5  "                         Strongly Recommended            >0.25 - <0.5             Recommended  0.1 - 0.25                  Discouraged                       Remeasure/reassess PCT  <0.1                         Strongly Discouraged         Remeasure/reassess PCT      As 28 day mortality risk marker: \"Change in Procalcitonin Result\" (>80% or <=80%) if Day 0 (or Day 1) and Day 4 values are available. Refer to [email or web address] [email or web address] [email or web address]pct-calculator.[email or web address]/    Change in PCT <=80 %   A decrease of PCT levels below or equal to 80% defines a positive change in PCT test result representing a higher risk for 28-day all-cause mortality of patients diagnosed with severe sepsis or septic shock.    Change in PCT >80 %   A decrease of PCT levels of more than 80% defines a negative change in PCT result representing a lower risk for 28-day all-cause mortality of patients diagnosed with severe sepsis or septic shock.                Comprehensive Metabolic Panel [[id number]]  (Abnormal) Collected: 01/13/22 0704    Specimen: Blood Updated: 01/13/22 0819     Glucose 293 mg/dL      BUN 11 mg/dL      Creatinine 0.74 mg/dL      Sodium 138 mmol/L      Potassium 4.0 mmol/L      Chloride 104 mmol/L      CO2 27.0 mmol/L      Calcium 8.1 mg/dL      Total Protein 5.7 g/dL      Albumin 3.80 g/dL      ALT (SGPT) 37 U/L      AST (SGOT) 21 U/L      Alkaline Phosphatase 80 U/L      Total Bilirubin 0.4 mg/dL      eGFR Non African Amer 116 mL/min/1.73      Globulin 1.9 gm/dL      A/G Ratio 2.0 g/dL      BUN/Creatinine Ratio 14.9     Anion Gap 7.0 mmol/L     Narrative:      GFR Normal >60  Chronic Kidney Disease <60  Kidney Failure <15      Lipid Panel [[id number]]  (Abnormal) Collected: 01/13/22 0704    Specimen: Blood Updated: 01/13/22 0815     Total Cholesterol 265 mg/dL      Triglycerides 521 mg/dL      HDL Cholesterol 25 mg/dL      LDL Cholesterol  142 mg/dL      VLDL Cholesterol 98 mg/dL      LDL/HDL Ratio 5.43    Narrative:      Cholesterol Reference " Ranges  (U.S. Department of Health and Human Services ATP III Classifications)    Desirable          <200 mg/dL  Borderline High    200-239 mg/dL  High Risk          >240 mg/dL      Triglyceride Reference Ranges  (U.S. Department of Health and Human Services ATP III Classifications)    Normal           <150 mg/dL  Borderline High  150-199 mg/dL  High             200-499 mg/dL  Very High        >500 mg/dL    HDL Reference Ranges  (U.S. Department of Health and Human Services ATP III Classifcations)    Low     <40 mg/dl (major risk factor for CHD)  High    >60 mg/dl ('negative' risk factor for CHD)        LDL Reference Ranges  (U.S. Department of Health and Human Services ATP III Classifcations)    Optimal          <100 mg/dL  Near Optimal     100-129 mg/dL  Borderline High  130-159 mg/dL  High             160-189 mg/dL  Very High        >189 mg/dL    Phosphorus [[id number]]  (Normal) Collected: 01/13/22 0704    Specimen: Blood Updated: 01/13/22 0815     Phosphorus 2.8 mg/dL     Magnesium [[id number]]  (Normal) Collected: 01/13/22 0704    Specimen: Blood Updated: 01/13/22 0815     Magnesium 2.2 mg/dL     CBC Auto Differential [[id number]]  (Abnormal) Collected: 01/13/22 0704    Specimen: Blood Updated: 01/13/22 0800     WBC 8.35 10*3/mm3      RBC 5.32 10*6/mm3      Hemoglobin 16.6 g/dL      Hematocrit 46.1 %      MCV 86.7 fL      MCH 31.2 pg      MCHC 36.0 g/dL      RDW 12.2 %      RDW-SD 38.5 fl      MPV 12.5 fL      Platelets 149 10*3/mm3      Neutrophil % 54.2 %      Lymphocyte % 35.7 %      Monocyte % 6.6 %      Eosinophil % 2.3 %      Basophil % 0.8 %      Immature Grans % 0.4 %      Neutrophils, Absolute 4.53 10*3/mm3      Lymphocytes, Absolute 2.98 10*3/mm3      Monocytes, Absolute 0.55 10*3/mm3      Eosinophils, Absolute 0.19 10*3/mm3      Basophils, Absolute 0.07 10*3/mm3      Immature Grans, Absolute 0.03 10*3/mm3      nRBC 0.0 /100 WBC     POC Glucose Once [[id number]]  (Abnormal) Collected: 01/13/22 0624     Specimen: Blood Updated: 01/13/22 0702     Glucose 282 mg/dL      Comment: RN NotifiedOperator: [id number] EARNEST BLAKEANNAMeter ID: [id number]       POC Glucose Once [[id number]]  (Abnormal) Collected: 01/12/22 1744    Specimen: Blood Updated: 01/12/22 2136     Glucose 403 mg/dL      Comment: Notify DoctorRN NotifiedOperator: [id number] MARGOT OSULLIVANMeter ID: [id number]       POC Glucose Once [[id number]]  (Abnormal) Collected: 01/12/22 2121    Specimen: Blood Updated: 01/12/22 2135     Glucose 350 mg/dL      Comment: RN NotifiedOperator: [id number] EARNEST REYESMeter ID: [id number]       CBC & Differential [[id number]]  (Abnormal) Collected: 01/12/22 1622    Specimen: Blood Updated: 01/12/22 2125    Narrative:      The following orders were created for panel order CBC & Differential.  Procedure                               Abnormality         Status                     ---------                               -----------         ------                     CBC Auto Differential[[id number]]        Abnormal            Final result               Scan Slide[[id number]]                   Normal              Final result                 Please view results for these tests on the individual orders.    Scan Slide [[id number]]  (Normal) Collected: 01/12/22 1622    Specimen: Blood Updated: 01/12/22 2125     RBC Morphology Normal     WBC Morphology Normal     Platelet Morphology Normal    Hemoglobin A1c [[id number]]  (Abnormal) Collected: 01/12/22 1955    Specimen: Blood Updated: 01/12/22 2024     Hemoglobin A1C 11.00 %     Narrative:      Hemoglobin A1C Ranges:    Increased Risk for Diabetes  5.7% to 6.4%  Diabetes                     >= 6.5%  Diabetic Goal                < 7.0%    Urinalysis With Microscopic If Indicated (No Culture) - Urine, Clean Catch [[id number]]  (Abnormal) Collected: 01/12/22 1828    Specimen: Urine, Clean Catch Updated: 01/12/22 1840     Color, UA Yellow     Appearance, UA Clear     pH, UA 5.5      Specific Gravity, UA 1.043     Comment: Result obtained by Refractometer        Glucose, UA >=1000 mg/dL (3+)     Ketones, UA Negative     Bilirubin, UA Negative     Blood, UA Negative     Protein, UA Negative     Leuk Esterase, UA Negative     Nitrite, UA Negative     Urobilinogen, UA 0.2 E.U./dL    Narrative:      Urine microscopic not indicated.    COVID-19 and FLU A/B PCR - Swab, Nasopharynx [[id number]]  (Normal) Collected: 01/12/22 1644    Specimen: Swab from Nasopharynx Updated: 01/12/22 1831     COVID19 Not Detected     Influenza A PCR Not Detected     Influenza B PCR Not Detected    Narrative:      Fact sheet for providers: https://www.fda.gov/media/274558/download    Fact sheet for patients: https://www.fda.gov/media/993795/download    Test performed by PCR.    Lactate Dehydrogenase [[id number]]  (Abnormal) Collected: 01/12/22 1622    Specimen: Blood Updated: 01/12/22 1718      U/L      Comment: Specimen hemolyzed.  Results may be affected.       Lipase [[id number]]  (Abnormal) Collected: 01/12/22 1622    Specimen: Blood Updated: 01/12/22 1656     Lipase 501 U/L     Comprehensive Metabolic Panel [[id number]]  (Abnormal) Collected: 01/12/22 1622    Specimen: Blood Updated: 01/12/22 1654     Glucose 493 mg/dL      BUN 10 mg/dL      Creatinine 0.79 mg/dL      Sodium 130 mmol/L      Potassium 4.4 mmol/L      Chloride 93 mmol/L      CO2 27.0 mmol/L      Calcium 9.3 mg/dL      Total Protein 7.2 g/dL      Albumin 4.80 g/dL      ALT (SGPT) 45 U/L      AST (SGOT) 22 U/L      Alkaline Phosphatase 106 U/L      Total Bilirubin 0.5 mg/dL      eGFR Non African Amer 108 mL/min/1.73      Globulin 2.4 gm/dL      A/G Ratio 2.0 g/dL      BUN/Creatinine Ratio 12.7     Anion Gap 10.0 mmol/L     Narrative:      GFR Normal >60  Chronic Kidney Disease <60  Kidney Failure <15      CBC Auto Differential [[id number]]  (Abnormal) Collected: 01/12/22 1622    Specimen: Blood Updated: 01/12/22 1654     WBC 9.82 10*3/mm3      RBC 5.61  10*6/mm3      Hemoglobin 18.1 g/dL      Hematocrit 48.8 %      MCV 87.0 fL      MCH 32.3 pg      MCHC 37.1 g/dL      RDW 12.1 %      RDW-SD 38.7 fl      MPV 12.5 fL      Platelets 186 10*3/mm3      Neutrophil % 51.0 %      Lymphocyte % 39.7 %      Monocyte % 6.1 %      Eosinophil % 1.9 %      Basophil % 1.0 %      Immature Grans % 0.3 %      Neutrophils, Absolute 5.00 10*3/mm3      Lymphocytes, Absolute 3.90 10*3/mm3      Monocytes, Absolute 0.60 10*3/mm3      Eosinophils, Absolute 0.19 10*3/mm3      Basophils, Absolute 0.10 10*3/mm3      Immature Grans, Absolute 0.03 10*3/mm3      nRBC 0.0 /100 WBC     Ethanol [[id number]] Collected: 01/12/22 1622    Specimen: Blood Updated: 01/12/22 1648     Ethanol <10 mg/dL      Ethanol % <0.010 %     Magnesium [[id number]]  (Normal) Collected: 01/12/22 1622    Specimen: Blood Updated: 01/12/22 1648     Magnesium 2.2 mg/dL     Troponin [[id number]]  (Normal) Collected: 01/12/22 1622    Specimen: Blood Updated: 01/12/22 1647     Troponin T <0.010 ng/mL     Narrative:      Troponin T Reference Range:  <= 0.03 ng/mL-   Negative for AMI  >0.03 ng/mL-     Abnormal for myocardial necrosis.  Clinicians would have to utilize clinical acumen, EKG, Troponin and serial changes to determine if it is an Acute Myocardial Infarction or myocardial injury due to an underlying chronic condition.       Results may be falsely decreased if patient taking Biotin.      Blood Gas, Arterial - [[id number]]  (Abnormal) Collected: 01/12/22 1640    Specimen: Arterial Blood Updated: 01/12/22 1644     Site Left Brachial     Flaco's Test N/A     pH, Arterial 7.399 pH units      pCO2, Arterial 44.6 mm Hg      pO2, Arterial 75.8 mm Hg      Comment: 84 Value below reference range        HCO3, Arterial 27.6 mmol/L      Comment: 83 Value above reference range        Base Excess, Arterial 2.1 mmol/L      Comment: 83 Value above reference range        O2 Saturation, Arterial 96.4 %      Barometric Pressure for Blood Gas  "749 mmHg      Modality Room Air     Ventilator Mode NA     Collected by RESPIRATORY     Comment: Meter: [id number]     :  [id number]       Acetone [[id number]]  (Normal) Collected: 01/12/22 1622    Specimen: Blood Updated: 01/12/22 1639     Acetone Negative            Chief Complaint on Day of Discharge: none    Hospital Course:      41-year-old male presented to the emergency room for evaluation of about 1 week history of polydipsia, polyuria and weight loss.  He checked his blood glucose at home and it was over 600.  ED work-up also showed elevated lipase but the patient did not have abdominal pain thus not associated with pancreatitis.  Right upper quadrant ultrasound was checked and was negative for gallbladder pathology.    The patient was placed on observation for treatment of new onset diabetes mellitus.  He was given IV fluid resuscitation.  Hemoglobin A1c is 11.  The patient has agreed to initiation on insulin therapy.  He will be discharged home on long-acting insulin to be taken at night and metformin twice daily.  He will need to follow-up with his PCP within 2 weeks for further management of diabetes mellitus.  The patient's wife was involved in his care.  I have had extensive discussion regarding lifestyle changes and nutrition as well as increase in physical activity in order to lose weight does improve blood glucose.        Condition on Discharge:  stable    Physical Exam on Discharge:  /87 (BP Location: Left arm, Patient Position: Lying)   Pulse 60   Temp 97 °F (36.1 °C) (Oral)   Resp 16   Ht 182.9 cm (72\")   Wt 119 kg (262 lb 11.2 oz)   SpO2 98%   BMI 35.63 kg/m²     Gen.: Appears as stated age.  No acute distress.  HEENT: EOMI.  PERRLA.  Sclerae anicteric.  Moist mucous membranes.  Neck: Supple.  No JVD.  No thyromegaly.  Chest: Clear to auscultation bilaterally.  No wheeze, rhonchi or rales.  Heart: Regular rhythm and rate.  No murmurs, rubs or gallops.  Abdomen: " Normoactive bowel sounds.  Nontender.  Obese abdomen.  Neurological: Cranial nerves II through XII are grossly intact.  No cerebellar signs.   Extremities: Good range of motion throughout.  No cyanosis, clubbing or edema.  Skin: Warm.  No rashes.  No ulcers.  Psychiatry: Cooperative.        Discharge Disposition:  Home or Self Care    Discharge Medications:     Discharge Medications      New Medications      Instructions Start Date   freestyle lancets   Cheek blood glucose  three times daily      glucose blood test strip   Use as instructed      glucose monitoring kit monitoring kit   1 each, Does not apply, As Needed      Insulin Glargine 100 UNIT/ML injection pen  Commonly known as: LANTUS SOLOSTAR  Notes to patient: 01/13/2022   10 Units, Subcutaneous, Nightly      metFORMIN 500 MG tablet  Commonly known as: GLUCOPHAGE  Notes to patient: 01/13/2022   500 mg, Oral, 2 Times Daily With Meals         Continue These Medications      Instructions Start Date   aspirin 81 MG EC tablet  Notes to patient: 01/14/2022   81 mg, Oral, Daily      HYDROcodone-acetaminophen 5-325 MG per tablet  Commonly known as: NORCO  Notes to patient: As needed   1-2 tablets, Oral, Every 4 Hours PRN             Discharge Diet: Diabetic    Activity at Discharge: As tolerated    Discharge Care Plan/Instructions: DC home.    1. follow-up with PCP in 2 weeks for adjustments of medications  2.  Monitor blood glucose fasting in the morning and 2 hours after meals  3.  Lose weight and walk 30 minutes daily  4.  Abstain from excessive alcohol use    Follow-up Appointments:   No future appointments.    Test Results Pending at Discharge:     Guilherme Lopez DO  01/13/22  12:29 CST

## 2022-01-13 NOTE — CONSULTS
Adult Nutrition  Assessment    Patient Name:  Jovany Knutson  YOB: 1980  MRN: [id number]  Admit Date:  1/12/2022    Assessment Date:  1/13/2022    Comments:  Pt admitted with hyperglycemia with a new Dx of DM.  HgbA1c 11.0.  He is being discharged.  RD provided education on Basic ADA guidelines and carbohydrates effect on sugars; Meal planning; SMBS with special attention to high and low sugars; and the importance of follow up for adjustments in meds or diet.  He and his wife voiced understanding.  Pt does report that he is very active and drinks a lot of regular sodas.  Educational materials along with RD name and number provided.       Reason for Assessment     Row Name 01/13/22 1213          Reason for Assessment    Reason For Assessment physician consult; diagnosis/disease state                Nutrition/Diet History     Row Name 01/13/22 1213          Nutrition/Diet History    Typical Food/Fluid Intake Pt is dressed and ready to be discharged.  He said that his family members have DM but he doesn't know much about DM.  His wife is present.  He does drink a lot of soft drinks.  He will make needed changes                Anthropometrics     Row Name 01/13/22 0443          Anthropometrics    Weight 119 kg (262 lb 11.2 oz)                Labs/Tests/Procedures/Meds     Row Name 01/13/22 1215          Labs/Procedures/Meds    Lab Results Comments HgbA1c 11.0;  Gluc 350/282/275            Medications    Pertinent Medications Comments SSI; Levemir                             Electronically signed by:  Sallie Lyon RD  01/13/22 12:19 CST

## 2022-01-14 NOTE — PAYOR COMM NOTE
"Naty Soliman  Case Management Extender  [phone] phone  [phone] fax    Auth# [id number]    Jovany Knutson (41 y.o. Male)             Date of Birth Social Security Number Address Home Phone MRN    1980  [id number]   [facility] [address] [phone] [id number]    Hoahaoism Marital Status             None        Admission Date Admission Type Admitting Provider Attending Provider Department, Room/Bed    1/12/22 Emergency Shamar Saravia MD  [facility] 3 [facility], 375/1    Discharge Date Discharge Disposition Discharge Destination          1/13/2022 Home or Self Care              Attending Provider: (none)   Allergies: No Known Allergies    Isolation: None   Infection: None   Code Status: Prior   Advance Care Planning Activity    Ht: 182.9 cm (72\")   Wt: 119 kg (262 lb 11.2 oz)    Admission Cmt: None   Principal Problem: Type 2 diabetes mellitus (HCC) [E11.9]                 Active Insurance as of 1/12/2022     Primary Coverage     Payor Plan Insurance Group Employer/Plan Group    [place] [id number]     Payor Plan Address Payor Plan Phone Number Payor Plan Fax Number Effective Dates    [employer] [address] [address] [phone]  1/1/2021 - None Entered    [facility] [id number]       Subscriber Name Subscriber Birth Date Member ID       JOVANY KNUTSON 1980 [id number]                 Emergency Contacts      (Rel.) Home Phone Work Phone Mobile Phone    Makenzie Knutson (Spouse) [phone] -- [phone]               Discharge Summary      Guilherme Lopez DO at 01/13/22 1226              [facility]  DISCHARGE SUMMARY       Date of Admission: 1/12/2022  Date of Discharge:  1/13/2022  Primary Care Physician: Provider, No Known    Presenting Problem/History of Present Illness:  Type 2 diabetes mellitus with hyperglycemia, without long-term current use of " insulin (HCC) [E11.65]  Acute pancreatitis, unspecified complication status, unspecified pancreatitis type [K85.90]       Final Discharge Diagnoses:  Active Hospital Problems    Diagnosis    • **Type 2 diabetes mellitus (HCC)        Consults:   Consults     Date and Time Order Name Status Description    1/12/2022  6:17 PM Hospitalist (on-call MD unless specified)            Procedures Performed:                 Pertinent Test Results:   Imaging Results (Last 7 Days)     Procedure Component Value Units Date/Time    US Gallbladder [[id number]] Collected: 01/12/22 1654     Updated: 01/12/22 2208    Narrative:      PROCEDURE:    US Abdomen Limited, Right Upper Quadrant    CLINICAL INDICATION:    pancreatitis    TECHNIQUE:    Real-time ultrasound of the right upper quadrant with image  documentation.    COMPARISON:    No relevant prior studies available.    FINDINGS:    LIVER:  Liver parenchyma appears echogenic relative to the  renal cortex, suggesting fatty infiltration. No intrahepatic bile  duct dilation. Patent portal vein with hepatopetal flow.    GALLBLADDER:  Gallbladder contracted despite reported NPO  status, limiting evaluation. No gallbladder wall thickening.  Sonographic Gerber's sign not evaluated; a positive sign would  support acute cholecystitis.    COMMON BILE DUCT:  Unremarkable as visualized.  No stones.  No  dilation.    PANCREAS:  Pancreas obscured by shadowing bowel gas.     RIGHT KIDNEY:  Unremarkable.  No hydronephrosis.      Impression:        Limited evaluation of the gallbladder due to contraction. No  definite sonographic evidence of cholecystitis.    Pancreas obscured by shadowing bowel gas.     Electronically signed by:  Radha Stockton MD  1/12/2022 6:10 PM  CST Workstation: [id number]    XR Chest 1 View [[id number]] Collected: 01/12/22 1643     Updated: 01/12/22 1806    Narrative:      PROCEDURE:    XR Chest, 1 View    CLINICAL INDICATION:    dka    TECHNIQUE:    Frontal view of the  "chest.    COMPARISON:  2/1/2017    FINDINGS:    LUNGS: Similar coarse interstitial lung markings which can be  chronic change.  No focal consolidation.     PLEURAL SPACE:  Unremarkable.  No visible pneumothorax.    HEART:  Unremarkable.  Cardiac silhouette within normal limits.    MEDIASTINUM:  Unremarkable.    BONES/JOINTS:  Unremarkable.      Impression:        No acute findings in the chest.    Electronically signed by:  Radha Stockton MD  1/12/2022 6:05 PM  CST Workstation: [id number]        Lab Results (last 24 hours)     Procedure Component Value Units Date/Time    POC Glucose Once [[id number]]  (Abnormal) Collected: 01/13/22 1126    Specimen: Blood Updated: 01/13/22 1140     Glucose 275 mg/dL      Comment: RN NotifiedOperator: [id number] YULI GARCIASMeter ID: [id number]       TSH [[id number]]  (Normal) Collected: 01/13/22 0704    Specimen: Blood Updated: 01/13/22 0828     TSH 1.630 uIU/mL     T4, Free [[id number]]  (Normal) Collected: 01/13/22 0704    Specimen: Blood Updated: 01/13/22 0828     Free T4 1.50 ng/dL     Narrative:      Results may be falsely increased if patient taking Biotin.      Procalcitonin [[id number]]  (Normal) Collected: 01/13/22 0704    Specimen: Blood Updated: 01/13/22 0821     Procalcitonin 0.07 ng/mL     Narrative:      As a Marker for Sepsis (Non-Neonates):     1. <0.5 ng/mL represents a low risk of severe sepsis and/or septic shock.  2. >2 ng/mL represents a high risk of severe sepsis and/or septic shock.    As a Marker for Lower Respiratory Tract Infections that require antibiotic therapy:  PCT on Admission     Antibiotic Therapy             6-12 Hrs later  >0.5                          Strongly Recommended            >0.25 - <0.5             Recommended  0.1 - 0.25                  Discouraged                       Remeasure/reassess PCT  <0.1                         Strongly Discouraged         Remeasure/reassess PCT      As 28 day mortality risk marker: \"Change in " "Procalcitonin Result\" (>80% or <=80%) if Day 0 (or Day 1) and Day 4 values are available. Refer to http://www.[employer]-pct-calculator.com/    Change in PCT <=80 %   A decrease of PCT levels below or equal to 80% defines a positive change in PCT test result representing a higher risk for 28-day all-cause mortality of patients diagnosed with severe sepsis or septic shock.    Change in PCT >80 %   A decrease of PCT levels of more than 80% defines a negative change in PCT result representing a lower risk for 28-day all-cause mortality of patients diagnosed with severe sepsis or septic shock.                Comprehensive Metabolic Panel [[id number]]  (Abnormal) Collected: 01/13/22 0704    Specimen: Blood Updated: 01/13/22 0819     Glucose 293 mg/dL      BUN 11 mg/dL      Creatinine 0.74 mg/dL      Sodium 138 mmol/L      Potassium 4.0 mmol/L      Chloride 104 mmol/L      CO2 27.0 mmol/L      Calcium 8.1 mg/dL      Total Protein 5.7 g/dL      Albumin 3.80 g/dL      ALT (SGPT) 37 U/L      AST (SGOT) 21 U/L      Alkaline Phosphatase 80 U/L      Total Bilirubin 0.4 mg/dL      eGFR Non African Amer 116 mL/min/1.73      Globulin 1.9 gm/dL      A/G Ratio 2.0 g/dL      BUN/Creatinine Ratio 14.9     Anion Gap 7.0 mmol/L     Narrative:      GFR Normal >60  Chronic Kidney Disease <60  Kidney Failure <15      Lipid Panel [[id number]]  (Abnormal) Collected: 01/13/22 0704    Specimen: Blood Updated: 01/13/22 0815     Total Cholesterol 265 mg/dL      Triglycerides 521 mg/dL      HDL Cholesterol 25 mg/dL      LDL Cholesterol  142 mg/dL      VLDL Cholesterol 98 mg/dL      LDL/HDL Ratio 5.43    Narrative:      Cholesterol Reference Ranges  (U.S. Department of Health and Human Services ATP III Classifications)    Desirable          <200 mg/dL  Borderline High    200-239 mg/dL  High Risk          >240 mg/dL      Triglyceride Reference Ranges  (U.S. Department of Health and Human Services ATP III Classifications)    Normal           <150 " mg/dL  Borderline High  150-199 mg/dL  High             200-499 mg/dL  Very High        >500 mg/dL    HDL Reference Ranges  (U.S. Department of Health and Human Services ATP III Classifcations)    Low     <40 mg/dl (major risk factor for CHD)  High    >60 mg/dl ('negative' risk factor for CHD)        LDL Reference Ranges  (U.S. Department of Health and Human Services ATP III Classifcations)    Optimal          <100 mg/dL  Near Optimal     100-129 mg/dL  Borderline High  130-159 mg/dL  High             160-189 mg/dL  Very High        >189 mg/dL    Phosphorus [[id number]]  (Normal) Collected: 01/13/22 0704    Specimen: Blood Updated: 01/13/22 0815     Phosphorus 2.8 mg/dL     Magnesium [[id number]]  (Normal) Collected: 01/13/22 0704    Specimen: Blood Updated: 01/13/22 0815     Magnesium 2.2 mg/dL     CBC Auto Differential [[id number]]  (Abnormal) Collected: 01/13/22 0704    Specimen: Blood Updated: 01/13/22 0800     WBC 8.35 10*3/mm3      RBC 5.32 10*6/mm3      Hemoglobin 16.6 g/dL      Hematocrit 46.1 %      MCV 86.7 fL      MCH 31.2 pg      MCHC 36.0 g/dL      RDW 12.2 %      RDW-SD 38.5 fl      MPV 12.5 fL      Platelets 149 10*3/mm3      Neutrophil % 54.2 %      Lymphocyte % 35.7 %      Monocyte % 6.6 %      Eosinophil % 2.3 %      Basophil % 0.8 %      Immature Grans % 0.4 %      Neutrophils, Absolute 4.53 10*3/mm3      Lymphocytes, Absolute 2.98 10*3/mm3      Monocytes, Absolute 0.55 10*3/mm3      Eosinophils, Absolute 0.19 10*3/mm3      Basophils, Absolute 0.07 10*3/mm3      Immature Grans, Absolute 0.03 10*3/mm3      nRBC 0.0 /100 WBC     POC Glucose Once [[id number]]  (Abnormal) Collected: 01/13/22 0624    Specimen: Blood Updated: 01/13/22 0702     Glucose 282 mg/dL      Comment: RN NotifiedOperator: [id number] EARNEST Guthrie ID: [id number]       POC Glucose Once [[id number]]  (Abnormal) Collected: 01/12/22 1744    Specimen: Blood Updated: 01/12/22 2136     Glucose 403 mg/dL      Comment: Notify  DoctorRJACQUELINE NotifiedOperator: [id number] MARGOT OSULLIVANMeter ID: [id number]       POC Glucose Once [[id number]]  (Abnormal) Collected: 01/12/22 2121    Specimen: Blood Updated: 01/12/22 2135     Glucose 350 mg/dL      Comment: RN NotifiedOperator: [id number] EARNEST REYESMeter ID: [id number]       CBC & Differential [[id number]]  (Abnormal) Collected: 01/12/22 1622    Specimen: Blood Updated: 01/12/22 2125    Narrative:      The following orders were created for panel order CBC & Differential.  Procedure                               Abnormality         Status                     ---------                               -----------         ------                     CBC Auto Differential[[id number]]        Abnormal            Final result               Scan Slide[[id number]]                   Normal              Final result                 Please view results for these tests on the individual orders.    Scan Slide [[id number]]  (Normal) Collected: 01/12/22 1622    Specimen: Blood Updated: 01/12/22 2125     RBC Morphology Normal     WBC Morphology Normal     Platelet Morphology Normal    Hemoglobin A1c [[id number]]  (Abnormal) Collected: 01/12/22 1955    Specimen: Blood Updated: 01/12/22 2024     Hemoglobin A1C 11.00 %     Narrative:      Hemoglobin A1C Ranges:    Increased Risk for Diabetes  5.7% to 6.4%  Diabetes                     >= 6.5%  Diabetic Goal                < 7.0%    Urinalysis With Microscopic If Indicated (No Culture) - Urine, Clean Catch [[id number]]  (Abnormal) Collected: 01/12/22 1828    Specimen: Urine, Clean Catch Updated: 01/12/22 1840     Color, UA Yellow     Appearance, UA Clear     pH, UA 5.5     Specific Gravity, UA 1.043     Comment: Result obtained by Refractometer        Glucose, UA >=1000 mg/dL (3+)     Ketones, UA Negative     Bilirubin, UA Negative     Blood, UA Negative     Protein, UA Negative     Leuk Esterase, UA Negative     Nitrite, UA Negative     Urobilinogen, UA 0.2 E.U./dL     Narrative:      Urine microscopic not indicated.    COVID-19 and FLU A/B PCR - Swab, Nasopharynx [[id number]]  (Normal) Collected: 01/12/22 1644    Specimen: Swab from Nasopharynx Updated: 01/12/22 1831     COVID19 Not Detected     Influenza A PCR Not Detected     Influenza B PCR Not Detected    Narrative:      Fact sheet for providers: https://www.fda.gov/media/369170/download    Fact sheet for patients: https://www.fda.gov/media/489606/download    Test performed by PCR.    Lactate Dehydrogenase [[id number]]  (Abnormal) Collected: 01/12/22 1622    Specimen: Blood Updated: 01/12/22 1718      U/L      Comment: Specimen hemolyzed.  Results may be affected.       Lipase [[id number]]  (Abnormal) Collected: 01/12/22 1622    Specimen: Blood Updated: 01/12/22 1656     Lipase 501 U/L     Comprehensive Metabolic Panel [[id number]]  (Abnormal) Collected: 01/12/22 1622    Specimen: Blood Updated: 01/12/22 1654     Glucose 493 mg/dL      BUN 10 mg/dL      Creatinine 0.79 mg/dL      Sodium 130 mmol/L      Potassium 4.4 mmol/L      Chloride 93 mmol/L      CO2 27.0 mmol/L      Calcium 9.3 mg/dL      Total Protein 7.2 g/dL      Albumin 4.80 g/dL      ALT (SGPT) 45 U/L      AST (SGOT) 22 U/L      Alkaline Phosphatase 106 U/L      Total Bilirubin 0.5 mg/dL      eGFR Non African Amer 108 mL/min/1.73      Globulin 2.4 gm/dL      A/G Ratio 2.0 g/dL      BUN/Creatinine Ratio 12.7     Anion Gap 10.0 mmol/L     Narrative:      GFR Normal >60  Chronic Kidney Disease <60  Kidney Failure <15      CBC Auto Differential [[id number]]  (Abnormal) Collected: 01/12/22 1622    Specimen: Blood Updated: 01/12/22 1654     WBC 9.82 10*3/mm3      RBC 5.61 10*6/mm3      Hemoglobin 18.1 g/dL      Hematocrit 48.8 %      MCV 87.0 fL      MCH 32.3 pg      MCHC 37.1 g/dL      RDW 12.1 %      RDW-SD 38.7 fl      MPV 12.5 fL      Platelets 186 10*3/mm3      Neutrophil % 51.0 %      Lymphocyte % 39.7 %      Monocyte % 6.1 %      Eosinophil % 1.9 %      Basophil %  1.0 %      Immature Grans % 0.3 %      Neutrophils, Absolute 5.00 10*3/mm3      Lymphocytes, Absolute 3.90 10*3/mm3      Monocytes, Absolute 0.60 10*3/mm3      Eosinophils, Absolute 0.19 10*3/mm3      Basophils, Absolute 0.10 10*3/mm3      Immature Grans, Absolute 0.03 10*3/mm3      nRBC 0.0 /100 WBC     Ethanol [[id number]] Collected: 01/12/22 1622    Specimen: Blood Updated: 01/12/22 1648     Ethanol <10 mg/dL      Ethanol % <0.010 %     Magnesium [[id number]]  (Normal) Collected: 01/12/22 1622    Specimen: Blood Updated: 01/12/22 1648     Magnesium 2.2 mg/dL     Troponin [[id number]]  (Normal) Collected: 01/12/22 1622    Specimen: Blood Updated: 01/12/22 1647     Troponin T <0.010 ng/mL     Narrative:      Troponin T Reference Range:  <= 0.03 ng/mL-   Negative for AMI  >0.03 ng/mL-     Abnormal for myocardial necrosis.  Clinicians would have to utilize clinical acumen, EKG, Troponin and serial changes to determine if it is an Acute Myocardial Infarction or myocardial injury due to an underlying chronic condition.       Results may be falsely decreased if patient taking Biotin.      Blood Gas, Arterial - [[id number]]  (Abnormal) Collected: 01/12/22 1640    Specimen: Arterial Blood Updated: 01/12/22 1644     Site Left Brachial     Flaco's Test N/A     pH, Arterial 7.399 pH units      pCO2, Arterial 44.6 mm Hg      pO2, Arterial 75.8 mm Hg      Comment: 84 Value below reference range        HCO3, Arterial 27.6 mmol/L      Comment: 83 Value above reference range        Base Excess, Arterial 2.1 mmol/L      Comment: 83 Value above reference range        O2 Saturation, Arterial 96.4 %      Barometric Pressure for Blood Gas 749 mmHg      Modality Room Air     Ventilator Mode NA     Collected by RESPIRATORY     Comment: Meter: [id number]     :  [id number]       Acetone [[id number]]  (Normal) Collected: 01/12/22 1622    Specimen: Blood Updated: 01/12/22 1639     Acetone Negative            Chief Complaint on Day of  "Discharge: none    Hospital Course:      41-year-old male presented to the emergency room for evaluation of about 1 week history of polydipsia, polyuria and weight loss.  He checked his blood glucose at home and it was over 600.  ED work-up also showed elevated lipase but the patient did not have abdominal pain thus not associated with pancreatitis.  Right upper quadrant ultrasound was checked and was negative for gallbladder pathology.    The patient was placed on observation for treatment of new onset diabetes mellitus.  He was given IV fluid resuscitation.  Hemoglobin A1c is 11.  The patient has agreed to initiation on insulin therapy.  He will be discharged home on long-acting insulin to be taken at night and metformin twice daily.  He will need to follow-up with his PCP within 2 weeks for further management of diabetes mellitus.  The patient's wife was involved in his care.  I have had extensive discussion regarding lifestyle changes and nutrition as well as increase in physical activity in order to lose weight does improve blood glucose.        Condition on Discharge:  stable    Physical Exam on Discharge:  /87 (BP Location: Left arm, Patient Position: Lying)   Pulse 60   Temp 97 °F (36.1 °C) (Oral)   Resp 16   Ht 182.9 cm (72\")   Wt 119 kg (262 lb 11.2 oz)   SpO2 98%   BMI 35.63 kg/m²     Gen.: Appears as stated age.  No acute distress.  HEENT: EOMI.  PERRLA.  Sclerae anicteric.  Moist mucous membranes.  Neck: Supple.  No JVD.  No thyromegaly.  Chest: Clear to auscultation bilaterally.  No wheeze, rhonchi or rales.  Heart: Regular rhythm and rate.  No murmurs, rubs or gallops.  Abdomen: Normoactive bowel sounds.  Nontender.  Obese abdomen.  Neurological: Cranial nerves II through XII are grossly intact.  No cerebellar signs.   Extremities: Good range of motion throughout.  No cyanosis, clubbing or edema.  Skin: Warm.  No rashes.  No ulcers.  Psychiatry: Cooperative.        Discharge " Disposition:  Home or Self Care    Discharge Medications:     Discharge Medications      New Medications      Instructions Start Date   freestyle lancets   Cheek blood glucose  three times daily      glucose blood test strip   Use as instructed      glucose monitoring kit monitoring kit   1 each, Does not apply, As Needed      Insulin Glargine 100 UNIT/ML injection pen  Commonly known as: LANTUS SOLOSTAR  Notes to patient: 01/13/2022   10 Units, Subcutaneous, Nightly      metFORMIN 500 MG tablet  Commonly known as: GLUCOPHAGE  Notes to patient: 01/13/2022   500 mg, Oral, 2 Times Daily With Meals         Continue These Medications      Instructions Start Date   aspirin 81 MG EC tablet  Notes to patient: 01/14/2022   81 mg, Oral, Daily      HYDROcodone-acetaminophen 5-325 MG per tablet  Commonly known as: NORCO  Notes to patient: As needed   1-2 tablets, Oral, Every 4 Hours PRN             Discharge Diet: Diabetic    Activity at Discharge: As tolerated    Discharge Care Plan/Instructions: DC home.    1. follow-up with PCP in 2 weeks for adjustments of medications  2.  Monitor blood glucose fasting in the morning and 2 hours after meals  3.  Lose weight and walk 30 minutes daily  4.  Abstain from excessive alcohol use    Follow-up Appointments:   No future appointments.    Test Results Pending at Discharge:     Guilherme Lopez DO  01/13/22  12:29 CST                    Electronically signed by Guilherme Lopez DO at 01/13/22 7553

## 2022-01-19 LAB
QT INTERVAL: 348 MS
QTC INTERVAL: 430 MS

## (undated) DEVICE — GLV SURG TRIUMPH LT PF LTX 7.5 STRL

## (undated) DEVICE — GLV SURG TRIUMPH LT PF LTX 6.5 STRL

## (undated) DEVICE — ANTIBACTERIAL UNDYED BRAIDED (POLYGLACTIN 910), SYNTHETIC ABSORBABLE SUTURE: Brand: COATED VICRYL

## (undated) DEVICE — STERILE POLYISOPRENE POWDER-FREE SURGICAL GLOVES WITH EMOLLIENT COATING: Brand: PROTEXIS

## (undated) DEVICE — GLV SURG SENSICARE GREEN W/ALOE PF LF 6.5 STRL

## (undated) DEVICE — GLV SURG SENSICARE GREEN W/ALOE PF LF 8 STRL

## (undated) DEVICE — 3M™ STERI-STRIP™ REINFORCED ADHESIVE SKIN CLOSURES, R1547, 1/2 IN X 4 IN (12 MM X 100 MM), 6 STRIPS/ENVELOPE: Brand: 3M™ STERI-STRIP™

## (undated) DEVICE — SPNG GZ WOVN 4X4IN 12PLY 10/BX STRL

## (undated) DEVICE — ADHS LIQ MASTISOL 2/3ML

## (undated) DEVICE — SUT ETHIB NO 0 OS4 X517H ETX517H

## (undated) DEVICE — SOL IRR NACL 0.9PCT BT 1000ML

## (undated) DEVICE — SUT VIC 0/0 UR6 27IN DYED J603H

## (undated) DEVICE — DRSNG SPNG GZ WOVN 8PLY 4X4IN 2PK LF STRL BX/50PK

## (undated) DEVICE — PK MAJ PROC LF 60

## (undated) DEVICE — DRSNG SURESITE WNDW 4X4.5

## (undated) DEVICE — SUT VIC 3/0 SH 27IN J416H